# Patient Record
Sex: MALE | Race: WHITE | Employment: OTHER | URBAN - METROPOLITAN AREA
[De-identification: names, ages, dates, MRNs, and addresses within clinical notes are randomized per-mention and may not be internally consistent; named-entity substitution may affect disease eponyms.]

---

## 2017-12-20 ENCOUNTER — HOSPITAL ENCOUNTER (OUTPATIENT)
Dept: PREADMISSION TESTING | Age: 67
Discharge: HOME | End: 2017-12-20
Payer: MEDICARE

## 2017-12-20 VITALS — BODY MASS INDEX: 48.97 KG/M2 | HEIGHT: 67 IN | WEIGHT: 312 LBS

## 2017-12-20 LAB
ANION GAP SERPL CALC-SCNC: 10 MMOL/L (ref 3–18)
BACTERIA SPEC CULT: NORMAL
BASOPHILS # BLD: 0 K/UL (ref 0–0.06)
BASOPHILS NFR BLD: 0 % (ref 0–2)
BUN SERPL-MCNC: 28 MG/DL (ref 7–18)
BUN/CREAT SERPL: 19 (ref 12–20)
CALCIUM SERPL-MCNC: 9.8 MG/DL (ref 8.5–10.1)
CHLORIDE SERPL-SCNC: 107 MMOL/L (ref 100–108)
CO2 SERPL-SCNC: 26 MMOL/L (ref 21–32)
CREAT SERPL-MCNC: 1.51 MG/DL (ref 0.6–1.3)
DIFFERENTIAL METHOD BLD: ABNORMAL
EOSINOPHIL # BLD: 0.1 K/UL (ref 0–0.4)
EOSINOPHIL NFR BLD: 2 % (ref 0–5)
ERYTHROCYTE [DISTWIDTH] IN BLOOD BY AUTOMATED COUNT: 13.4 % (ref 11.6–14.5)
GLUCOSE SERPL-MCNC: 147 MG/DL (ref 74–99)
HBA1C MFR BLD: 6.5 % (ref 4.5–5.6)
HCT VFR BLD AUTO: 31.9 % (ref 36–48)
HGB BLD-MCNC: 10.4 G/DL (ref 13–16)
LYMPHOCYTES # BLD: 1.1 K/UL (ref 0.9–3.6)
LYMPHOCYTES NFR BLD: 20 % (ref 21–52)
MCH RBC QN AUTO: 30.8 PG (ref 24–34)
MCHC RBC AUTO-ENTMCNC: 32.6 G/DL (ref 31–37)
MCV RBC AUTO: 94.4 FL (ref 74–97)
MONOCYTES # BLD: 0.6 K/UL (ref 0.05–1.2)
MONOCYTES NFR BLD: 10 % (ref 3–10)
NEUTS SEG # BLD: 3.7 K/UL (ref 1.8–8)
NEUTS SEG NFR BLD: 68 % (ref 40–73)
PLATELET # BLD AUTO: 180 K/UL (ref 135–420)
PMV BLD AUTO: 9.4 FL (ref 9.2–11.8)
POTASSIUM SERPL-SCNC: 4.6 MMOL/L (ref 3.5–5.5)
RBC # BLD AUTO: 3.38 M/UL (ref 4.7–5.5)
SERVICE CMNT-IMP: NORMAL
SODIUM SERPL-SCNC: 143 MMOL/L (ref 136–145)
WBC # BLD AUTO: 5.6 K/UL (ref 4.6–13.2)

## 2017-12-20 PROCEDURE — 80048 BASIC METABOLIC PNL TOTAL CA: CPT | Performed by: ORTHOPAEDIC SURGERY

## 2017-12-20 PROCEDURE — 87086 URINE CULTURE/COLONY COUNT: CPT | Performed by: ORTHOPAEDIC SURGERY

## 2017-12-20 PROCEDURE — 83036 HEMOGLOBIN GLYCOSYLATED A1C: CPT | Performed by: ORTHOPAEDIC SURGERY

## 2017-12-20 PROCEDURE — 87641 MR-STAPH DNA AMP PROBE: CPT | Performed by: ORTHOPAEDIC SURGERY

## 2017-12-20 PROCEDURE — 85025 COMPLETE CBC W/AUTO DIFF WBC: CPT | Performed by: ORTHOPAEDIC SURGERY

## 2017-12-20 RX ORDER — MAGNESIUM 200 MG
800 TABLET ORAL 2 TIMES DAILY
COMMUNITY

## 2017-12-20 RX ORDER — TRAMADOL HYDROCHLORIDE 50 MG/1
50 TABLET ORAL
COMMUNITY
End: 2018-02-10

## 2017-12-20 RX ORDER — LOSARTAN POTASSIUM 25 MG/1
12.5 TABLET ORAL
COMMUNITY
End: 2018-01-18

## 2017-12-20 RX ORDER — METOPROLOL TARTRATE 50 MG/1
50 TABLET ORAL
COMMUNITY
End: 2018-01-18

## 2017-12-20 RX ORDER — INSULIN LISPRO 100 [IU]/ML
INJECTION, SOLUTION SUBCUTANEOUS
COMMUNITY

## 2017-12-20 RX ORDER — SODIUM CHLORIDE, SODIUM LACTATE, POTASSIUM CHLORIDE, CALCIUM CHLORIDE 600; 310; 30; 20 MG/100ML; MG/100ML; MG/100ML; MG/100ML
125 INJECTION, SOLUTION INTRAVENOUS CONTINUOUS
Status: CANCELLED | OUTPATIENT
Start: 2017-12-20

## 2017-12-20 RX ORDER — CHOLECALCIFEROL (VITAMIN D3) 125 MCG
1 CAPSULE ORAL DAILY
COMMUNITY
End: 2019-02-25

## 2017-12-20 NOTE — PERIOP NOTES
Pt reports sleep apnea and uses a CPAP. Instructed to bring in DOS. Pt has hearing aids but no other removable prosthetic devices. Care Fusion kit given to patient with instructions. Reviewed Jaren wipes. No family history of MH. Pt to check with endocrinologist re:  Evening Toujeo dosage. Pt had EKG at [facility]. Requested tracing. Pt meets criteria for special populations.

## 2017-12-21 LAB
BACTERIA SPEC CULT: NORMAL
SERVICE CMNT-IMP: NORMAL

## 2018-01-23 ENCOUNTER — HOSPITAL ENCOUNTER (OUTPATIENT)
Dept: PREADMISSION TESTING | Age: 68
Discharge: HOME | End: 2018-01-23
Payer: MEDICARE

## 2018-01-23 LAB
ALBUMIN SERPL-MCNC: 3.4 G/DL (ref 3.4–5)
ALBUMIN/GLOB SERPL: 1.1 {RATIO} (ref 0.8–1.7)
ALP SERPL-CCNC: 75 U/L (ref 45–117)
ALT SERPL-CCNC: 35 U/L (ref 16–61)
ANION GAP SERPL CALC-SCNC: 9 MMOL/L (ref 3–18)
AST SERPL-CCNC: 38 U/L (ref 15–37)
BILIRUB SERPL-MCNC: 0.7 MG/DL (ref 0.2–1)
BUN SERPL-MCNC: 33 MG/DL (ref 7–18)
BUN/CREAT SERPL: 24 (ref 12–20)
CALCIUM SERPL-MCNC: 10.4 MG/DL (ref 8.5–10.1)
CHLORIDE SERPL-SCNC: 109 MMOL/L (ref 100–108)
CO2 SERPL-SCNC: 26 MMOL/L (ref 21–32)
CREAT SERPL-MCNC: 1.38 MG/DL (ref 0.6–1.3)
ERYTHROCYTE [DISTWIDTH] IN BLOOD BY AUTOMATED COUNT: 14.4 % (ref 11.6–14.5)
GLOBULIN SER CALC-MCNC: 3 G/DL (ref 2–4)
GLUCOSE SERPL-MCNC: 95 MG/DL (ref 74–99)
HCT VFR BLD AUTO: 34.6 % (ref 36–48)
HGB BLD-MCNC: 10.9 G/DL (ref 13–16)
MCH RBC QN AUTO: 30.5 PG (ref 24–34)
MCHC RBC AUTO-ENTMCNC: 31.5 G/DL (ref 31–37)
MCV RBC AUTO: 96.9 FL (ref 74–97)
PLATELET # BLD AUTO: 135 K/UL (ref 135–420)
PMV BLD AUTO: 10.2 FL (ref 9.2–11.8)
POTASSIUM SERPL-SCNC: 5.5 MMOL/L (ref 3.5–5.5)
PROT SERPL-MCNC: 6.4 G/DL (ref 6.4–8.2)
RBC # BLD AUTO: 3.57 M/UL (ref 4.7–5.5)
SODIUM SERPL-SCNC: 144 MMOL/L (ref 136–145)
WBC # BLD AUTO: 4.4 K/UL (ref 4.6–13.2)

## 2018-01-23 PROCEDURE — 80053 COMPREHEN METABOLIC PANEL: CPT | Performed by: ORTHOPAEDIC SURGERY

## 2018-01-23 PROCEDURE — 85027 COMPLETE CBC AUTOMATED: CPT | Performed by: ORTHOPAEDIC SURGERY

## 2018-01-23 PROCEDURE — 36415 COLL VENOUS BLD VENIPUNCTURE: CPT | Performed by: ORTHOPAEDIC SURGERY

## 2018-01-30 ENCOUNTER — ANESTHESIA EVENT (OUTPATIENT)
Dept: SURGERY | Age: 68
DRG: 483 | End: 2018-01-30
Payer: MEDICARE

## 2018-02-03 NOTE — H&P
Patient Name:   Megan Linton  Account #:  [de-identified]  YOB: 1950    Chief Complaint:  Right shoulder pain and weakness. History of Chief Complaint: This is a 71-year-old male who complains of pain and weakness in his right shoulder. He has been seeing Dr. Marina Moreno, who had him get a MRI of the right shoulder. This study is reviewed. It shows cuff tear arthropathy with tears of the subscapularis, infraspinatus, and supraspinatus with a previous tear of the biceps.     Past Medical/Surgical History:    Disease/Disorder Type Date Side Surgery Date Side Comment       Cataract extraction  bilateral    Arthritis          Chronic renal disease stage 3          Coronary artery disease          Diabetes type 2          Diabetic neuropathy          Heart disease          High cholesterol          Hypertension          Obesity          Sleep apnea    CPAP          Aortic value replacement, bioprosthetic                    Spinal fusion, lumbar 10/07/2015 L3-S1        CABG 2012         Carpal tunnel release 2013       Allergies:    Ingredient Reaction Medication Name Comment   CODEINE      ACETAMINOPHEN  Percocet    OXYCODONE HCL  Percocet        Current Medications:    Medication Directions   furosemide 80 mg tablet take 1 tablet by oral route  every other day   Aspirin Low Dose 81 mg tablet,delayed release take 1 tablet by oral route  every morning   atorvastatin 80 mg tablet take 1 tablet by oral route  every morning   fenofibrate nanocrystallized 48 mg tablet take 1 tablet by oral route  every morning   Humalog Mix 75-25 100 unit/mL subcutaneous suspension per sliding scale   tramadol 50 mg tablet take 1 tablet by oral route  as needed   Colace 100 mg capsule take 1 capsule by oral route  every morning   Claritin 10 mg tablet take 1 tablet by oral route as needed   multivitamin tablet take 1 tablet by oral route  every morning   metoprolol succinate ER 50 mg tablet,extended release 24 hr take 1 tablet by oral route  every evening   metoprolol succinate  mg tablet,extended release 24 hr take 1 tablet by oral route  every morning   nitroglycerin 0.4 mg sublingual tablet place 1 tablet by sublingual route at 1st sign of attack; may repeat every 5 minutes up to 3 tabs; if no relief seek medical help   Vitamin D2 50,000 unit capsule take 1 capsule by oral route  every week   Toujeo SoloStar 300 unit/mL (1.5 mL) subcutaneous insulin pen inject by subcutaneous route as per insulin protocol   amlodipine 10 mg tablet take 1 tablet by oral route  every day   Veltassa 8.4 gram oral powder packet take 2 packet by oral route  every day add to 30 mL water and mix; add additional 60 mL water, stir and drink immediately     Social History:    SMOKING  Status Tobacco Type Units Per Day Yrs Used   Never smoker      ALCOHOL  There is no history of alcohol use. Family History:    Disease Detail Family Member Age Cause of Death Comments   Family history of Cancer, unknown   N    Family history of Diabetes mellitus   N    Family history of Heart disease   N    Family history of Alcoholism   N    Family history of Hypertension   N    Family history of Renal disease   N      Review of Systems:    Pertinent negatives include fever, fainting and swelling of feet. Vitals:  Date BP Pulse Temp (F) Resp. (per min.) Height (Total in.) Weight (lbs.) BMI   10/20/2017     70.00  42.76   07/06/2015 170/86 63   70.00  42.76     Physical Examination:  General:  Patient in no acute distress. Vital Signs: See database for vital signs. HEENT: Normal.  Neck: Supple. Chest: Clear. Heart: Regular sinus rhythm. Abdomen: Soft, nontender, no adenopathy. Neurologic: Normal exam.  Extremities:    Physical exam of the right shoulder shows generally good motion, but he has weakness both in external rotation and abduction. He has pain with extremes of motion.   Neurological exam is normal.      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 Plan:    He will be scheduled for a right reverse total shoulder arthroplasty. He understands the risks and benefits of the procedure, and he is ready to proceed. He was given a sling. Postop, he will get Dilaudid 4 mg.

## 2018-02-09 ENCOUNTER — HOSPITAL ENCOUNTER (INPATIENT)
Age: 68
LOS: 1 days | Discharge: HOME | DRG: 483 | End: 2018-02-10
Attending: ORTHOPAEDIC SURGERY | Admitting: ORTHOPAEDIC SURGERY
Payer: MEDICARE

## 2018-02-09 ENCOUNTER — ANESTHESIA (OUTPATIENT)
Dept: SURGERY | Age: 68
DRG: 483 | End: 2018-02-09
Payer: MEDICARE

## 2018-02-09 ENCOUNTER — APPOINTMENT (OUTPATIENT)
Dept: GENERAL RADIOLOGY | Age: 68
DRG: 483 | End: 2018-02-09
Attending: ORTHOPAEDIC SURGERY
Payer: MEDICARE

## 2018-02-09 DIAGNOSIS — M19.011 PRIMARY OSTEOARTHRITIS OF RIGHT SHOULDER: Primary | ICD-10-CM

## 2018-02-09 LAB
ABO + RH BLD: NORMAL
BLOOD GROUP ANTIBODIES SERPL: NORMAL
GLUCOSE BLD STRIP.AUTO-MCNC: 169 MG/DL (ref 70–110)
GLUCOSE BLD STRIP.AUTO-MCNC: 74 MG/DL (ref 70–110)
GLUCOSE BLD STRIP.AUTO-MCNC: 83 MG/DL (ref 70–110)
GLUCOSE BLD STRIP.AUTO-MCNC: 85 MG/DL (ref 70–110)
GLUCOSE BLD STRIP.AUTO-MCNC: 85 MG/DL (ref 70–110)
SPECIMEN EXP DATE BLD: NORMAL

## 2018-02-09 PROCEDURE — 77030002922 HC SUT FBRWRE ARTH -B: Performed by: ORTHOPAEDIC SURGERY

## 2018-02-09 PROCEDURE — 36415 COLL VENOUS BLD VENIPUNCTURE: CPT | Performed by: ANESTHESIOLOGY

## 2018-02-09 PROCEDURE — 74011250636 HC RX REV CODE- 250/636: Performed by: ANESTHESIOLOGY

## 2018-02-09 PROCEDURE — 77030008683 HC TU ET CUF COVD -A: Performed by: ANESTHESIOLOGY

## 2018-02-09 PROCEDURE — 74011250636 HC RX REV CODE- 250/636

## 2018-02-09 PROCEDURE — C1776 JOINT DEVICE (IMPLANTABLE): HCPCS | Performed by: ORTHOPAEDIC SURGERY

## 2018-02-09 PROCEDURE — 77030031139 HC SUT VCRL2 J&J -A: Performed by: ORTHOPAEDIC SURGERY

## 2018-02-09 PROCEDURE — 74011250636 HC RX REV CODE- 250/636: Performed by: ORTHOPAEDIC SURGERY

## 2018-02-09 PROCEDURE — 77030006807 HC BLD SAW RECIP KMET -B: Performed by: ORTHOPAEDIC SURGERY

## 2018-02-09 PROCEDURE — 77030037875 HC DRSG MEPILEX <16IN BORD MOLN -A: Performed by: ORTHOPAEDIC SURGERY

## 2018-02-09 PROCEDURE — 76942 ECHO GUIDE FOR BIOPSY: CPT | Performed by: ORTHOPAEDIC SURGERY

## 2018-02-09 PROCEDURE — 77030012891

## 2018-02-09 PROCEDURE — 77030013728 HC IRR FEM CNL STRY -B: Performed by: ORTHOPAEDIC SURGERY

## 2018-02-09 PROCEDURE — 74011250637 HC RX REV CODE- 250/637: Performed by: ORTHOPAEDIC SURGERY

## 2018-02-09 PROCEDURE — 97161 PT EVAL LOW COMPLEX 20 MIN: CPT

## 2018-02-09 PROCEDURE — 77030020255 HC SOL INJ LR 1000ML BG: Performed by: ORTHOPAEDIC SURGERY

## 2018-02-09 PROCEDURE — 77030008477 HC STYL SATN SLP COVD -A: Performed by: ANESTHESIOLOGY

## 2018-02-09 PROCEDURE — 76060000035 HC ANESTHESIA 2 TO 2.5 HR: Performed by: ORTHOPAEDIC SURGERY

## 2018-02-09 PROCEDURE — 74011000250 HC RX REV CODE- 250: Performed by: ORTHOPAEDIC SURGERY

## 2018-02-09 PROCEDURE — 97110 THERAPEUTIC EXERCISES: CPT

## 2018-02-09 PROCEDURE — 76010000131 HC OR TIME 2 TO 2.5 HR: Performed by: ORTHOPAEDIC SURGERY

## 2018-02-09 PROCEDURE — 65270000029 HC RM PRIVATE

## 2018-02-09 PROCEDURE — 77030013079 HC BLNKT BAIR HGGR 3M -A: Performed by: ANESTHESIOLOGY

## 2018-02-09 PROCEDURE — 76210000016 HC OR PH I REC 1 TO 1.5 HR: Performed by: ORTHOPAEDIC SURGERY

## 2018-02-09 PROCEDURE — 0RRJ00Z REPLACEMENT OF RIGHT SHOULDER JOINT WITH REVERSE BALL AND SOCKET SYNTHETIC SUBSTITUTE, OPEN APPROACH: ICD-10-PCS | Performed by: ORTHOPAEDIC SURGERY

## 2018-02-09 PROCEDURE — 74011636637 HC RX REV CODE- 636/637: Performed by: ORTHOPAEDIC SURGERY

## 2018-02-09 PROCEDURE — 82962 GLUCOSE BLOOD TEST: CPT

## 2018-02-09 PROCEDURE — 77030011640 HC PAD GRND REM COVD -A: Performed by: ORTHOPAEDIC SURGERY

## 2018-02-09 PROCEDURE — 77030018846 HC SOL IRR STRL H20 ICUM -A

## 2018-02-09 PROCEDURE — 77030006643: Performed by: ANESTHESIOLOGY

## 2018-02-09 PROCEDURE — 86900 BLOOD TYPING SEROLOGIC ABO: CPT | Performed by: ANESTHESIOLOGY

## 2018-02-09 PROCEDURE — 77030013708 HC HNDPC SUC IRR PULS STRY –B: Performed by: ORTHOPAEDIC SURGERY

## 2018-02-09 PROCEDURE — 77030020782 HC GWN BAIR PAWS FLX 3M -B: Performed by: ORTHOPAEDIC SURGERY

## 2018-02-09 PROCEDURE — 73020 X-RAY EXAM OF SHOULDER: CPT

## 2018-02-09 PROCEDURE — 97530 THERAPEUTIC ACTIVITIES: CPT

## 2018-02-09 PROCEDURE — 74011000250 HC RX REV CODE- 250

## 2018-02-09 PROCEDURE — 74011000250 HC RX REV CODE- 250: Performed by: ANESTHESIOLOGY

## 2018-02-09 PROCEDURE — 77030033138 HC SUT PGA STRATFX J&J -B: Performed by: ORTHOPAEDIC SURGERY

## 2018-02-09 PROCEDURE — 77030020256 HC SOL INJ NACL 0.9%  500ML: Performed by: ORTHOPAEDIC SURGERY

## 2018-02-09 PROCEDURE — 77030027138 HC INCENT SPIROMETER -A: Performed by: ORTHOPAEDIC SURGERY

## 2018-02-09 PROCEDURE — 64415 NJX AA&/STRD BRCH PLXS IMG: CPT | Performed by: ANESTHESIOLOGY

## 2018-02-09 PROCEDURE — 77030018846 HC SOL IRR STRL H20 ICUM -A: Performed by: ORTHOPAEDIC SURGERY

## 2018-02-09 DEVICE — KIT BNE SCR L26MM DIA4.5MM GLEN SHLDR ORNG COMPR LOK CAP: Type: IMPLANTABLE DEVICE | Site: SHOULDER | Status: FUNCTIONAL

## 2018-02-09 DEVICE — IMPLANTABLE DEVICE: Type: IMPLANTABLE DEVICE | Site: SHOULDER | Status: FUNCTIONAL

## 2018-02-09 DEVICE — SPHERE GLEN DIA42MM REG STD SHLDR CO CHROM LCK SCR PRI RVS: Type: IMPLANTABLE DEVICE | Site: SHOULDER | Status: FUNCTIONAL

## 2018-02-09 DEVICE — LINER HUM DIA42MM +0MM OFFSET STD SHLDR PRI RVS EQUINOXE: Type: IMPLANTABLE DEVICE | Site: SHOULDER | Status: FUNCTIONAL

## 2018-02-09 DEVICE — SCR TORQUE DEFINING KIT -- EQUINOXE: Type: IMPLANTABLE DEVICE | Site: SHOULDER | Status: FUNCTIONAL

## 2018-02-09 DEVICE — TRAY HUM ADPT REV +0 -- EQUINOXE: Type: IMPLANTABLE DEVICE | Site: SHOULDER | Status: FUNCTIONAL

## 2018-02-09 DEVICE — SCR LCK CAP KIT 4.5X30MM BLU -- EQUINOXE: Type: IMPLANTABLE DEVICE | Site: SHOULDER | Status: FUNCTIONAL

## 2018-02-09 DEVICE — KIT BONE SCR L38MM DIA4.5MM GLEN SHLDR GRN COMPR LCK CAP RVS: Type: IMPLANTABLE DEVICE | Site: SHOULDER | Status: FUNCTIONAL

## 2018-02-09 DEVICE — SCR BNE LCK GLENOSPHERE -- EQUINOXE: Type: IMPLANTABLE DEVICE | Site: SHOULDER | Status: FUNCTIONAL

## 2018-02-09 RX ORDER — NALOXONE HYDROCHLORIDE 0.4 MG/ML
0.1 INJECTION, SOLUTION INTRAMUSCULAR; INTRAVENOUS; SUBCUTANEOUS AS NEEDED
Status: DISCONTINUED | OUTPATIENT
Start: 2018-02-09 | End: 2018-02-09 | Stop reason: HOSPADM

## 2018-02-09 RX ORDER — INSULIN LISPRO 100 [IU]/ML
INJECTION, SOLUTION INTRAVENOUS; SUBCUTANEOUS ONCE
Status: DISCONTINUED | OUTPATIENT
Start: 2018-02-09 | End: 2018-02-09 | Stop reason: HOSPADM

## 2018-02-09 RX ORDER — METOPROLOL SUCCINATE 100 MG/1
100 TABLET, EXTENDED RELEASE ORAL DAILY
Status: DISCONTINUED | OUTPATIENT
Start: 2018-02-10 | End: 2018-02-10 | Stop reason: HOSPADM

## 2018-02-09 RX ORDER — PROPOFOL 10 MG/ML
INJECTION, EMULSION INTRAVENOUS AS NEEDED
Status: DISCONTINUED | OUTPATIENT
Start: 2018-02-09 | End: 2018-02-09 | Stop reason: HOSPADM

## 2018-02-09 RX ORDER — ONDANSETRON 2 MG/ML
INJECTION INTRAMUSCULAR; INTRAVENOUS AS NEEDED
Status: DISCONTINUED | OUTPATIENT
Start: 2018-02-09 | End: 2018-02-09 | Stop reason: HOSPADM

## 2018-02-09 RX ORDER — FENTANYL CITRATE 50 UG/ML
50 INJECTION, SOLUTION INTRAMUSCULAR; INTRAVENOUS
Status: DISCONTINUED | OUTPATIENT
Start: 2018-02-09 | End: 2018-02-09 | Stop reason: HOSPADM

## 2018-02-09 RX ORDER — ASPIRIN 325 MG
325 TABLET, DELAYED RELEASE (ENTERIC COATED) ORAL
Status: DISCONTINUED | OUTPATIENT
Start: 2018-02-10 | End: 2018-02-10

## 2018-02-09 RX ORDER — MIDAZOLAM HYDROCHLORIDE 1 MG/ML
INJECTION, SOLUTION INTRAMUSCULAR; INTRAVENOUS
Status: COMPLETED
Start: 2018-02-09 | End: 2018-02-09

## 2018-02-09 RX ORDER — HYDROMORPHONE HYDROCHLORIDE 4 MG/1
4 TABLET ORAL
Status: DISCONTINUED | OUTPATIENT
Start: 2018-02-09 | End: 2018-02-10 | Stop reason: HOSPADM

## 2018-02-09 RX ORDER — ROCURONIUM BROMIDE 10 MG/ML
INJECTION, SOLUTION INTRAVENOUS AS NEEDED
Status: DISCONTINUED | OUTPATIENT
Start: 2018-02-09 | End: 2018-02-09 | Stop reason: HOSPADM

## 2018-02-09 RX ORDER — SODIUM CHLORIDE, SODIUM LACTATE, POTASSIUM CHLORIDE, CALCIUM CHLORIDE 600; 310; 30; 20 MG/100ML; MG/100ML; MG/100ML; MG/100ML
1000 INJECTION, SOLUTION INTRAVENOUS CONTINUOUS
Status: DISCONTINUED | OUTPATIENT
Start: 2018-02-09 | End: 2018-02-09 | Stop reason: HOSPADM

## 2018-02-09 RX ORDER — EPHEDRINE SULFATE/0.9% NACL/PF 25 MG/5 ML
SYRINGE (ML) INTRAVENOUS AS NEEDED
Status: DISCONTINUED | OUTPATIENT
Start: 2018-02-09 | End: 2018-02-09 | Stop reason: HOSPADM

## 2018-02-09 RX ORDER — INSULIN GLARGINE 100 [IU]/ML
50 INJECTION, SOLUTION SUBCUTANEOUS
Status: DISCONTINUED | OUTPATIENT
Start: 2018-02-09 | End: 2018-02-10 | Stop reason: HOSPADM

## 2018-02-09 RX ORDER — MIDAZOLAM HYDROCHLORIDE 1 MG/ML
INJECTION, SOLUTION INTRAMUSCULAR; INTRAVENOUS AS NEEDED
Status: DISCONTINUED | OUTPATIENT
Start: 2018-02-09 | End: 2018-02-09 | Stop reason: HOSPADM

## 2018-02-09 RX ORDER — SODIUM CHLORIDE, SODIUM LACTATE, POTASSIUM CHLORIDE, CALCIUM CHLORIDE 600; 310; 30; 20 MG/100ML; MG/100ML; MG/100ML; MG/100ML
125 INJECTION, SOLUTION INTRAVENOUS CONTINUOUS
Status: DISCONTINUED | OUTPATIENT
Start: 2018-02-09 | End: 2018-02-10 | Stop reason: HOSPADM

## 2018-02-09 RX ORDER — OXYCODONE AND ACETAMINOPHEN 5; 325 MG/1; MG/1
2 TABLET ORAL
Status: DISCONTINUED | OUTPATIENT
Start: 2018-02-09 | End: 2018-02-09

## 2018-02-09 RX ORDER — SODIUM CHLORIDE 0.9 % (FLUSH) 0.9 %
5-10 SYRINGE (ML) INJECTION EVERY 8 HOURS
Status: DISCONTINUED | OUTPATIENT
Start: 2018-02-09 | End: 2018-02-10 | Stop reason: HOSPADM

## 2018-02-09 RX ORDER — MELATONIN
2000 DAILY
Status: DISCONTINUED | OUTPATIENT
Start: 2018-02-10 | End: 2018-02-10 | Stop reason: HOSPADM

## 2018-02-09 RX ORDER — SODIUM CHLORIDE 0.9 % (FLUSH) 0.9 %
5-10 SYRINGE (ML) INJECTION AS NEEDED
Status: DISCONTINUED | OUTPATIENT
Start: 2018-02-09 | End: 2018-02-10 | Stop reason: HOSPADM

## 2018-02-09 RX ORDER — LIDOCAINE HYDROCHLORIDE 20 MG/ML
INJECTION, SOLUTION EPIDURAL; INFILTRATION; INTRACAUDAL; PERINEURAL AS NEEDED
Status: DISCONTINUED | OUTPATIENT
Start: 2018-02-09 | End: 2018-02-09 | Stop reason: HOSPADM

## 2018-02-09 RX ORDER — INSULIN LISPRO 100 [IU]/ML
INJECTION, SOLUTION INTRAVENOUS; SUBCUTANEOUS
Status: DISCONTINUED | OUTPATIENT
Start: 2018-02-09 | End: 2018-02-10 | Stop reason: HOSPADM

## 2018-02-09 RX ORDER — LANOLIN ALCOHOL/MO/W.PET/CERES
800 CREAM (GRAM) TOPICAL 2 TIMES DAILY
Status: DISCONTINUED | OUTPATIENT
Start: 2018-02-09 | End: 2018-02-10 | Stop reason: HOSPADM

## 2018-02-09 RX ORDER — DIPHENHYDRAMINE HYDROCHLORIDE 50 MG/ML
12.5 INJECTION, SOLUTION INTRAMUSCULAR; INTRAVENOUS
Status: DISCONTINUED | OUTPATIENT
Start: 2018-02-09 | End: 2018-02-10 | Stop reason: HOSPADM

## 2018-02-09 RX ORDER — BUPIVACAINE HYDROCHLORIDE AND EPINEPHRINE 5; 5 MG/ML; UG/ML
INJECTION, SOLUTION EPIDURAL; INTRACAUDAL; PERINEURAL AS NEEDED
Status: DISCONTINUED | OUTPATIENT
Start: 2018-02-09 | End: 2018-02-09 | Stop reason: HOSPADM

## 2018-02-09 RX ORDER — MAGNESIUM SULFATE 100 %
4 CRYSTALS MISCELLANEOUS AS NEEDED
Status: DISCONTINUED | OUTPATIENT
Start: 2018-02-09 | End: 2018-02-09 | Stop reason: HOSPADM

## 2018-02-09 RX ORDER — NALOXONE HYDROCHLORIDE 0.4 MG/ML
0.1 INJECTION, SOLUTION INTRAMUSCULAR; INTRAVENOUS; SUBCUTANEOUS AS NEEDED
Status: DISCONTINUED | OUTPATIENT
Start: 2018-02-09 | End: 2018-02-10 | Stop reason: HOSPADM

## 2018-02-09 RX ORDER — FENOFIBRATE 48 MG/1
48 TABLET, COATED ORAL DAILY
Status: DISCONTINUED | OUTPATIENT
Start: 2018-02-10 | End: 2018-02-10 | Stop reason: HOSPADM

## 2018-02-09 RX ORDER — ATORVASTATIN CALCIUM 20 MG/1
80 TABLET, FILM COATED ORAL DAILY
Status: DISCONTINUED | OUTPATIENT
Start: 2018-02-10 | End: 2018-02-10 | Stop reason: HOSPADM

## 2018-02-09 RX ORDER — FENTANYL CITRATE 50 UG/ML
INJECTION, SOLUTION INTRAMUSCULAR; INTRAVENOUS
Status: COMPLETED
Start: 2018-02-09 | End: 2018-02-09

## 2018-02-09 RX ORDER — AMLODIPINE BESYLATE 5 MG/1
5 TABLET ORAL
Status: DISCONTINUED | OUTPATIENT
Start: 2018-02-09 | End: 2018-02-10 | Stop reason: HOSPADM

## 2018-02-09 RX ORDER — DIPHENHYDRAMINE HCL 25 MG
25 CAPSULE ORAL
Status: DISCONTINUED | OUTPATIENT
Start: 2018-02-09 | End: 2018-02-10 | Stop reason: HOSPADM

## 2018-02-09 RX ORDER — FLUMAZENIL 0.1 MG/ML
0.2 INJECTION INTRAVENOUS
Status: DISCONTINUED | OUTPATIENT
Start: 2018-02-09 | End: 2018-02-09 | Stop reason: HOSPADM

## 2018-02-09 RX ORDER — LORATADINE 10 MG/1
10 TABLET ORAL DAILY PRN
Status: DISCONTINUED | OUTPATIENT
Start: 2018-02-09 | End: 2018-02-10 | Stop reason: HOSPADM

## 2018-02-09 RX ORDER — ACETAMINOPHEN 325 MG/1
650 TABLET ORAL
Status: DISCONTINUED | OUTPATIENT
Start: 2018-02-09 | End: 2018-02-10 | Stop reason: HOSPADM

## 2018-02-09 RX ORDER — GLYCOPYRROLATE 0.2 MG/ML
INJECTION INTRAMUSCULAR; INTRAVENOUS AS NEEDED
Status: DISCONTINUED | OUTPATIENT
Start: 2018-02-09 | End: 2018-02-09 | Stop reason: HOSPADM

## 2018-02-09 RX ORDER — DOCUSATE SODIUM 100 MG/1
100 CAPSULE, LIQUID FILLED ORAL DAILY
Status: DISCONTINUED | OUTPATIENT
Start: 2018-02-10 | End: 2018-02-10 | Stop reason: HOSPADM

## 2018-02-09 RX ORDER — ONDANSETRON 2 MG/ML
4 INJECTION INTRAMUSCULAR; INTRAVENOUS
Status: DISCONTINUED | OUTPATIENT
Start: 2018-02-09 | End: 2018-02-10 | Stop reason: HOSPADM

## 2018-02-09 RX ORDER — OXYCODONE AND ACETAMINOPHEN 5; 325 MG/1; MG/1
1 TABLET ORAL
Status: DISCONTINUED | OUTPATIENT
Start: 2018-02-09 | End: 2018-02-09

## 2018-02-09 RX ORDER — SODIUM CHLORIDE 0.9 % (FLUSH) 0.9 %
5-10 SYRINGE (ML) INJECTION AS NEEDED
Status: DISCONTINUED | OUTPATIENT
Start: 2018-02-09 | End: 2018-02-09 | Stop reason: HOSPADM

## 2018-02-09 RX ORDER — METOPROLOL SUCCINATE 50 MG/1
50 TABLET, EXTENDED RELEASE ORAL EVERY OTHER DAY
Status: DISCONTINUED | OUTPATIENT
Start: 2018-02-09 | End: 2018-02-10 | Stop reason: HOSPADM

## 2018-02-09 RX ORDER — NITROGLYCERIN 0.4 MG/1
0.4 TABLET SUBLINGUAL
Status: DISCONTINUED | OUTPATIENT
Start: 2018-02-09 | End: 2018-02-10 | Stop reason: HOSPADM

## 2018-02-09 RX ORDER — DEXTROSE 50 % IN WATER (D50W) INTRAVENOUS SYRINGE
25-50 AS NEEDED
Status: DISCONTINUED | OUTPATIENT
Start: 2018-02-09 | End: 2018-02-09 | Stop reason: HOSPADM

## 2018-02-09 RX ORDER — FENTANYL CITRATE 50 UG/ML
INJECTION, SOLUTION INTRAMUSCULAR; INTRAVENOUS AS NEEDED
Status: DISCONTINUED | OUTPATIENT
Start: 2018-02-09 | End: 2018-02-09 | Stop reason: HOSPADM

## 2018-02-09 RX ORDER — ASPIRIN 81 MG/1
81 TABLET ORAL DAILY
Status: DISCONTINUED | OUTPATIENT
Start: 2018-02-10 | End: 2018-02-10 | Stop reason: HOSPADM

## 2018-02-09 RX ORDER — TRAMADOL HYDROCHLORIDE 50 MG/1
50 TABLET ORAL
Status: DISCONTINUED | OUTPATIENT
Start: 2018-02-09 | End: 2018-02-10 | Stop reason: HOSPADM

## 2018-02-09 RX ORDER — ROPIVACAINE HYDROCHLORIDE 5 MG/ML
INJECTION, SOLUTION EPIDURAL; INFILTRATION; PERINEURAL AS NEEDED
Status: DISCONTINUED | OUTPATIENT
Start: 2018-02-09 | End: 2018-02-09 | Stop reason: HOSPADM

## 2018-02-09 RX ADMIN — FUROSEMIDE 60 MG: 20 TABLET ORAL at 14:49

## 2018-02-09 RX ADMIN — ROCURONIUM BROMIDE 20 MG: 10 INJECTION, SOLUTION INTRAVENOUS at 08:30

## 2018-02-09 RX ADMIN — BUPIVACAINE HYDROCHLORIDE 6 ML/HR: 7.5 INJECTION, SOLUTION EPIDURAL; RETROBULBAR at 10:54

## 2018-02-09 RX ADMIN — Medication 3 G: at 14:51

## 2018-02-09 RX ADMIN — Medication 3 G: at 07:56

## 2018-02-09 RX ADMIN — Medication 10 MG: at 08:04

## 2018-02-09 RX ADMIN — ONDANSETRON 4 MG: 2 INJECTION INTRAMUSCULAR; INTRAVENOUS at 07:38

## 2018-02-09 RX ADMIN — Medication 10 MG: at 08:54

## 2018-02-09 RX ADMIN — ROCURONIUM BROMIDE 50 MG: 10 INJECTION, SOLUTION INTRAVENOUS at 07:48

## 2018-02-09 RX ADMIN — Medication 3 G: at 21:59

## 2018-02-09 RX ADMIN — Medication 10 MG: at 07:59

## 2018-02-09 RX ADMIN — GLYCOPYRROLATE 0.1 MG: 0.2 INJECTION INTRAMUSCULAR; INTRAVENOUS at 08:25

## 2018-02-09 RX ADMIN — ROPIVACAINE HYDROCHLORIDE 20 ML: 5 INJECTION, SOLUTION EPIDURAL; INFILTRATION; PERINEURAL at 07:20

## 2018-02-09 RX ADMIN — INSULIN GLARGINE 50 UNITS: 100 INJECTION, SOLUTION SUBCUTANEOUS at 22:00

## 2018-02-09 RX ADMIN — FENTANYL CITRATE 100 MCG: 50 INJECTION, SOLUTION INTRAMUSCULAR; INTRAVENOUS at 07:11

## 2018-02-09 RX ADMIN — MIDAZOLAM HYDROCHLORIDE 2 MG: 1 INJECTION, SOLUTION INTRAMUSCULAR; INTRAVENOUS at 07:11

## 2018-02-09 RX ADMIN — ROCURONIUM BROMIDE 10 MG: 10 INJECTION, SOLUTION INTRAVENOUS at 09:21

## 2018-02-09 RX ADMIN — FENTANYL CITRATE 25 MCG: 50 INJECTION, SOLUTION INTRAMUSCULAR; INTRAVENOUS at 09:54

## 2018-02-09 RX ADMIN — METOPROLOL SUCCINATE 50 MG: 50 TABLET, EXTENDED RELEASE ORAL at 22:01

## 2018-02-09 RX ADMIN — PROPOFOL 150 MG: 10 INJECTION, EMULSION INTRAVENOUS at 07:48

## 2018-02-09 RX ADMIN — ROPIVACAINE HYDROCHLORIDE 5 ML: 5 INJECTION, SOLUTION EPIDURAL; INFILTRATION; PERINEURAL at 07:23

## 2018-02-09 RX ADMIN — FENTANYL CITRATE 50 MCG: 50 INJECTION, SOLUTION INTRAMUSCULAR; INTRAVENOUS at 07:48

## 2018-02-09 RX ADMIN — FENTANYL CITRATE 25 MCG: 50 INJECTION, SOLUTION INTRAMUSCULAR; INTRAVENOUS at 09:56

## 2018-02-09 RX ADMIN — SODIUM CHLORIDE, SODIUM LACTATE, POTASSIUM CHLORIDE, AND CALCIUM CHLORIDE 125 ML/HR: 600; 310; 30; 20 INJECTION, SOLUTION INTRAVENOUS at 06:47

## 2018-02-09 RX ADMIN — AMLODIPINE BESYLATE 5 MG: 5 TABLET ORAL at 22:01

## 2018-02-09 RX ADMIN — MIDAZOLAM HYDROCHLORIDE 2 MG: 1 INJECTION, SOLUTION INTRAMUSCULAR; INTRAVENOUS at 07:38

## 2018-02-09 RX ADMIN — LIDOCAINE HYDROCHLORIDE 60 MG: 20 INJECTION, SOLUTION EPIDURAL; INFILTRATION; INTRACAUDAL; PERINEURAL at 07:48

## 2018-02-09 RX ADMIN — SODIUM CHLORIDE, SODIUM LACTATE, POTASSIUM CHLORIDE, AND CALCIUM CHLORIDE: 600; 310; 30; 20 INJECTION, SOLUTION INTRAVENOUS at 09:41

## 2018-02-09 RX ADMIN — Medication 10 MG: at 08:22

## 2018-02-09 RX ADMIN — Medication 800 MG: at 22:00

## 2018-02-09 RX ADMIN — SODIUM CHLORIDE, SODIUM LACTATE, POTASSIUM CHLORIDE, AND CALCIUM CHLORIDE: 600; 310; 30; 20 INJECTION, SOLUTION INTRAVENOUS at 07:46

## 2018-02-09 RX ADMIN — Medication 10 MG: at 07:56

## 2018-02-09 RX ADMIN — HYDROMORPHONE HYDROCHLORIDE: 10 INJECTION, SOLUTION INTRAMUSCULAR; INTRAVENOUS; SUBCUTANEOUS at 10:45

## 2018-02-09 NOTE — IP AVS SNAPSHOT
[address] [address] 
[phone] Patient: Igor Ku MRN: [id number] :1950 About your hospitalization You were admitted on:  2018 You last received care in the:  THE [facility] [address] You were discharged on:  February 10, 2018 Why you were hospitalized Your primary diagnosis was:  Not on File Your diagnoses also included:  Djd Of Right Shoulder Follow-up Information Follow up With Details Comments Contact Info Garry Smith MD On 2018 Follow up appointment @ 1:00pm [address] [address] 
[phone] Earl Ram MD   [address] [address] [place] [address] 
[phone] Discharge Orders None A check mahad indicates which time of day the medication should be taken. My Medications START taking these medications Instructions Each Dose to Equal  
 Morning Noon Evening Bedtime HYDROmorphone 4 mg tablet Commonly known as:  DILAUDID Your last dose was: Your next dose is: Take 1 Tab by mouth every four (4) hours as needed for Pain. Max Daily Amount: 24 mg.  
 4 mg CONTINUE taking these medications Instructions Each Dose to Equal  
 Morning Noon Evening Bedtime  
 amLODIPine 5 mg tablet Commonly known as:  Nhung Zapien Your last dose was: Your next dose is: Take 5 mg by mouth nightly. Indications: hypertension 5 mg  
    
   
   
   
  
 aspirin delayed-release 81 mg tablet Your last dose was: Your next dose is: Take  by mouth daily. atorvastatin 80 mg tablet Commonly known as:  LIPITOR Your last dose was: Your next dose is: Take 80 mg by mouth daily. Indications: hypercholesterolemia 80 mg CLARITIN 10 mg tablet Generic drug:  loratadine Your last dose was: Your next dose is: Take 10 mg by mouth as needed for Allergies. 10 mg  
    
   
   
   
  
 COLACE 100 mg capsule Generic drug:  docusate sodium Your last dose was: Your next dose is: Take 100 mg by mouth daily. 100 mg HumaLOG Noe KwikPen 100 unit/mL Inph Generic drug:  insulin lispro Your last dose was: Your next dose is:    
   
   
 by SubCUTAneous route Before breakfast, lunch, and dinner. Sliding scale- pt has very tight personal guidelines   Indications: type 2 diabetes mellitus LASIX 20 mg tablet Generic drug:  furosemide Your last dose was: Your next dose is: Take 60 mg by mouth every other day. Indications: Edema 60 mg  
    
   
   
   
  
 magnesium 200 mg Tab Your last dose was: Your next dose is: Take 800 mg by mouth two (2) times a day. 800 mg  
    
   
   
   
  
 * metoprolol succinate 100 mg tablet Commonly known as:  TOPROL-XL Your last dose was: Your next dose is: Take 100 mg by mouth daily. Indications: hypertension 100 mg  
    
   
   
   
  
 * metoprolol succinate 50 mg XL tablet Commonly known as:  TOPROL-XL Your last dose was: Your next dose is: Take 50 mg by mouth See Admin Instructions. Takes every other evening at bedtime. Indications: hypertension 50 mg  
    
   
   
   
  
 nitroglycerin 0.4 mg SL tablet Commonly known as:  NITROSTAT Your last dose was: Your next dose is:    
   
   
 by SubLINGual route every five (5) minutes as needed for Chest Pain. TOUJEO SOLOSTAR 300 unit/mL (1.5 mL) Inpn Generic drug:  insulin glargine Your last dose was: Your next dose is:    
   
   
 50 Units by SubCUTAneous route nightly. 50 Units TRICOR 48 mg tablet Generic drug:  fenofibrate nanocrystallized Your last dose was: Your next dose is: Take  by mouth daily. Indications: hypercholesterolemia VITAMIN D3 2,000 unit Tab Generic drug:  cholecalciferol (vitamin D3) Your last dose was: Your next dose is: Take 1 Tab by mouth daily. Indications: Vitamin D Deficiency 1 Tab * Notice: This list has 2 medication(s) that are the same as other medications prescribed for you. Read the directions carefully, and ask your doctor or other care provider to review them with you. STOP taking these medications   
 multivitamin tablet Commonly known as:  ONE A DAY  
   
  
 traMADol 50 mg tablet Commonly known as:  ULTRAM  
   
  
  
  
Where to Get Your Medications Information on where to get these meds will be given to you by the nurse or doctor. ! Ask your nurse or doctor about these medications HYDROmorphone 4 mg tablet Discharge Instructions Shoulder Replacement Surgery: What to Expect at Home Your Recovery Shoulder replacement surgery replaces the worn parts of your shoulder joint. When you leave the hospital, your arm will be in a sling. It will be helpful if there is someone to help you at home for the next few weeks or until you have more energy and can move around better. You will go home with a bandage and stitches or staples. You can remove the bandage when your doctor tells you to. If the stitches are not the type that dissolve, your doctor will remove them in 10 to 14 days. You may still have some mild pain, and the area may be swollen for several months after surgery. Your doctor will give you medicine for the pain.  
You will continue the rehabilitation program (rehab) you started in the hospital. The better you do with your rehab exercises, the sooner you will get your strength and movement back. Depending on your job, you may be able to return to work as early as 2 to 3 weeks after surgery, as long as you avoid certain arm movements, such as lifting. This care sheet gives you a general idea about how long it will take for you to recover. But each person recovers at a different pace. Follow the steps below to get better as quickly as possible. How can you care for yourself at home? Activity ? · Rest when you feel tired. You may take a nap, but don't stay in bed all day. ? · Work with your physical therapist to learn the best way to exercise. ? · You will have a sling to wear at night. And it's a good idea to also put a small stack of folded sheets or towels under your upper arm while you are in bed to keep your arm from dropping too far back. ? · Your arm should stay next to your body or in front of it for several weeks, both while you are up and during sleep. ? · Don't lift anything with the affected arm for 6 weeks. ? · You may need to take sponge baths until your stitches or staples have been removed. You will probably be able to shower 24 hours after they are removed. ? · Ask your doctor when you can drive again. ? · Ask your doctor when it is okay for you to have sex. ? · Your doctor may advise you to give up activities that put stress on that shoulder. This includes sports such as weight lifting or tennis, unless your tennis arm was not the one affected. Diet ? · By the time you leave the hospital, you will probably be eating your normal diet. If your stomach is upset, try bland, low-fat foods like plain rice, broiled chicken, toast, and yogurt. Your doctor may recommend that you take iron and vitamin supplements. ? · Drink plenty of fluids (unless your doctor tells you not to). ? · You may notice that your bowel movements are not regular right after your surgery. This is common.  Try to avoid constipation and straining with bowel movements. You may want to take a fiber supplement every day. If you have not had a bowel movement after a couple of days, ask your doctor about taking a mild laxative. Medicines ? · Your doctor will tell you if and when you can restart your medicines. He or she will also give you instructions about taking any new medicines. ? · If you take blood thinners, such as warfarin (Coumadin), clopidogrel (Plavix), or aspirin, be sure to talk to your doctor. He or she will tell you if and when to start taking those medicines again. Make sure that you understand exactly what your doctor wants you to do. ? · Be safe with medicines. Take pain medicines exactly as directed. ¨ If the doctor gave you a prescription medicine for pain, take it as prescribed. ¨ If you are not taking a prescription pain medicine, ask your doctor if you can take an over-the-counter medicine. ? · If you think your pain medicine is making you sick to your stomach: 
¨ Take your medicine after meals (unless your doctor has told you not to). ¨ Ask your doctor for a different pain medicine. ? · If your doctor prescribed antibiotics, take them as directed. Don't stop taking them just because you feel better. You need to take the full course of antibiotics. ? · If you take a blood thinner, be sure you get instructions about how to take your medicine safely. Blood thinners can cause serious bleeding problems. Incision care ? · You will have a dressing over the cut (incision). A dressing helps the incision heal and protects it. Your doctor will tell you how to take care of this. ? · Keep the area clean and dry. Exercise ? · Shoulder rehabilitation is a series of exercises you do after your surgery. This helps you get back your shoulder's range of motion and strength.  You will work with your doctor and physical therapist to plan this exercise program. To get the best results, you need to do the exercises correctly and as often and as long as your doctor tells you. ?Ice 
? · For pain, put ice or a cold pack on the area for 10 to 20 minutes at a time. Put a thin cloth between the ice and your skin. Follow-up care is a key part of your treatment and safety. Be sure to make and go to all appointments, and call your doctor if you are having problems. It's also a good idea to know your test results and keep a list of the medicines you take. When should you call for help? Call 911 anytime you think you may need emergency care. For example, call if: 
? · You have severe trouble breathing. ? · You have symptoms of a blood clot in your lung (called a pulmonary embolism). These may include: 
¨ Sudden chest pain. ¨ Trouble breathing. ¨ Coughing up blood. ?Call your doctor now or seek immediate medical care if: 
? · You have severe or increasing pain. ? · You have symptoms of infection, such as: 
¨ Increased pain, swelling, warmth, or redness. ¨ Red streaks or pus. ¨ A fever. ? · You have tingling, weakness, or numbness in your arm. ? · Your arm turns cold or changes color. ? · You have symptoms of a blood clot in your leg (called a deep vein thrombosis). These may include: 
¨ Pain in the calf, back of the knee, thigh, or groin. ¨ Redness and swelling in the leg or groin. ? Watch closely for changes in your health, and be sure to contact your doctor if: 
? · You do not get better as expected. Where can you learn more? Go to http://mery-nohemi.info/. Enter T633 in the search box to learn more about \"Shoulder Replacement Surgery: What to Expect at Home. \" 
Call Dr. Aurelio Everett office  if you have temperature greater than 100.1 unrelieved by an increase in fluids and tylenol, and increase in redness, swelling or drainage at incision site, and red streaks from the incision site, or you are unable to move arm or keep down food or medication. Patient armband removed and shredded Introducing [facility]! Jessi Santiago introduces [employer] patient portal. Now you can access parts of your medical record, email your doctor's office, and request medication refills online. 1. In your internet browser, go to [email or web address] 2. Click on the First Time User? Click Here link in the Sign In box. You will see the New Member Sign Up page. 3. Enter your [employer] Access Code exactly as it appears below. You will not need to use this code after youve completed the sign-up process. If you do not sign up before the expiration date, you must request a new code. · [employer] Access Code: [email or web address] Expires: 3/15/2018  6:09 PM 
 
4. Enter the last four digits of your Social Security Number (xxxx) and Date of Birth (mm/dd/yyyy) as indicated and click Submit. You will be taken to the next sign-up page. 5. Create a [employer] ID. This will be your [employer] login ID and cannot be changed, so think of one that is secure and easy to remember. 6. Create a [employer] password. You can change your password at any time. 7. Enter your Password Reset Question and Answer. This can be used at a later time if you forget your password. 8. Enter your e-mail address. You will receive e-mail notification when new information is available in [address] 9. Click Sign Up. You can now view and download portions of your medical record. 10. Click the Download Summary menu link to download a portable copy of your medical information. If you have questions, please visit the Frequently Asked Questions section of the [employer] website. Remember, [employer] is NOT to be used for urgent needs. For medical emergencies, dial 911. Now available from your iPhone and Android! Providers Seen During Your Hospitalization Provider Specialty Primary office phone Corby Maxwell, [address] [facility] [phone] Your Primary Care Physician (PCP) Primary Care Physician Office Phone Office Fax Remedios Santillan [phone] You are allergic to the following Allergen Reactions Codeine Rash Percocet (Oxycodone-Acetaminophen) Nausea Only No problem with acetaminophen Recent Documentation Height Weight BMI Smoking Status 1.753 m 138.3 kg 45.04 kg/m2 Never Smoker Emergency Contacts Name Discharge Info Relation Home Work Mobile [facility] DISCHARGE CAREGIVER [3] Son [22]   [phone] Patient Belongings The following personal items are in your possession at time of discharge: 
  Dental Appliances: None  Visual Aid: Glasses      Home Medications: None   Jewelry: None  Clothing: Undergarments, Footwear, Socks, Shirt, Pants (with Reed Noel)    Other Valuables: Cell Phone, Eyeglasses, Other (comment) (ID with Reed Noel and sunglasses) Please provide this summary of care documentation to your next provider. Signatures-by signing, you are acknowledging that this After Visit Summary has been reviewed with you and you have received a copy. Patient Signature:  ____________________________________________________________ Date:  ____________________________________________________________  
  
Spencer Pierre Provider Signature:  ____________________________________________________________ Date:  ____________________________________________________________

## 2018-02-09 NOTE — ANESTHESIA PREPROCEDURE EVALUATION
Anesthetic History   No history of anesthetic complications            Review of Systems / Medical History  Patient summary reviewed, nursing notes reviewed and pertinent labs reviewed    Pulmonary        Sleep apnea: CPAP           Neuro/Psych   Within defined limits           Cardiovascular    Hypertension: well controlled          Cardiac stents and CABG  Pertinent negatives: No valvular problems/murmurs  Exercise tolerance: >4 METS  Comments: Cardiac valve replacement   GI/Hepatic/Renal  Within defined limits              Endo/Other    Diabetes: well controlled, type 1    Morbid obesity and arthritis  Pertinent negatives: No hypothyroidism and hyperthyroidism   Other Findings              Physical Exam    Airway  Mallampati: III  TM Distance: < 4 cm  Neck ROM: normal range of motion   Mouth opening: Normal    Comments: beard Cardiovascular    Rhythm: regular  Rate: normal         Dental      Comments: Teeth with separations between incisors   Pulmonary  Breath sounds clear to auscultation               Abdominal  GI exam deferred       Other Findings            Anesthetic Plan    ASA: 3  Anesthesia type: general and regional - interscalene block      Post-op pain plan if not by surgeon: peripheral nerve block continuous    Induction: Intravenous  Anesthetic plan and risks discussed with: Patient      Risk of a block include nerve injury, bleeding, infection, and failure as the most common ones although they rare.

## 2018-02-09 NOTE — OP NOTES
PREOPERATIVE DIAGNOSIS: Cuff Tear Arthropathy, right shoulder. POSTOPERATIVE DIAGNOSIS: Cuff Tear Arthropathy, right shoulder. PROCEDURE: right Reverse Total Shoulder Arthroplasty [employer]    IMPLANTS:   Implant Name Type Inv. Item Serial No.  Lot No. LRB No. Used Action   SCR BNE LCK GLENOSPHERE -- E[employer] - [id number]  SCR BNE LCK GLENOSPHERE -- Mayra Quest [id number] [employer]  Right 1 Implanted   SCR TORQUE DEFINING KIT -- EQUIN[employer]E - [id number]  SCR TORQUE DEFINING KIT -- Mayra Quest [id number] [employer]  Right 1 Implanted   PLATE KRYSTA HUM ADPT REV +5MM -- Mayra Quest - [id number]  PLATE KRYSTA HUM ADPT REV +5MM -- Mayra Quest [id number] [employer]  Right 1 Implanted   HEAD GLENOSPHERE REV 42MM -- EQUINOXE - [id number]  HEAD GLENOSPHERE REV 42MM -- EQUIN[employer]E [id number] [employer]  Right 1 Implanted   SCR LCK CAP KIT 4.5X38MM RED -- EQUINOXE - [id number]  SCR LCK CAP KIT 4.5X38MM RED -- Mayra Quest [id number] [employer]  Right 1 Implanted   SCR LCK CAP KIT 4.5X30MM APOLLO -- EQUINOXE - [id number]  SCR LCK CAP KIT 4.5X30MM APOLLO -- EQUINOXE [id number] [employer]  Right 1 Implanted   SCR LCK CAP KIT 4.5X26MM ORG -- EQUINOXE - [id number]  SCR LCK CAP KIT 4.5X26MM ORG -- EQU[employer]E [id number] [employer]  Right 1 Implanted   TRAY HUM ADPT REV +0 -- EQUINOXE - [id number]  TRAY HUM ADPT REV +0 -- EQU[employer]E [id number] [employer]  Right 1 Implanted   EQUINOX HUMERAL STEM PRIMARY, PRESS-FIT   [id number] [employer]  Right 1 Implanted   LINER HUM REV 42MM +0 -- EQU[employer]OXE - [id number]   LINER HUM REV 42MM +0 -- EQUIN[employer]E [id number] [employer]   Right 1 Implanted       SURGEON: Elvis Cross MD    ANESTHESIOLOGIST: Anesthesiologist: Atul Barr MD  CRNA: Timmy Meckel, CRNA     ANAESTHESIA: general anesthesia after scalene block. COMPLICATIONS: None. ESTIMATED BLOOD LOSS: 200 cc     DRAINS:  None. DESCRIPTION OF PROCEDURE: This 75 y. o.year-old male  with a history of persistent right shoulder pain and weakness, had a history of an unrepairable rotator cuff tear and severe DJD of the glenohumeral joint. The patient then opted to proceed with reconstructive surgery. The patient was taken to the operating room and placed in the beach-chair position, and the right shoulder was prepped and draped in a normal manner. The skin was injected with 0.5% Marcaine with epinephrine. An oblique incision was made over the anterior aspect of the shoulder. The incision was carried through the subcutaneous tissue through the deep fascia and the cephalic vein was retracted laterally. The dissection was carried around the humeral head. The biceps was dissected free, tenodesed distally with #2 FiberWire suture and the intraarticular portion was excised. The humeral head was dislocated, and it was noted to have severe arthritic changes, along with a massive unrepairable tear of the rotator cuff. The head was osteotomized using a saw guide. The shaft was prepared using sequential reamers. The humeral head was retracted inferiorly and posteriorly. Attention was then turned to the glenoid, which showed severe arthritic changes. The labrum was excised circumferentially. The glenoid reamer was then used and finally the metaglene was placed in position, it was then secured using 3 screws, all of which were locking. The  glenosphere was then screwed in position. The proximal humerus was then prepared and trials were placed in position with sequential reductions. The trials were removed. The shaft was irrigated using antiobiotic solution via pulsed lavage. Trial reductions were again performed. The final humeral  component was placed in position. The shoulder was reduced. Good motion with no sign of instability was confirmed. The wound was thoroughly irrigated with antibiotic solution. Hemostasis was assured using a Bovie. The delto-pectoral interval was closed using 0 Vicryl suture. The subcutaneous tissue was closed with 2-0 vicryl and the skin with a Quill stitch.  A sterile compressive dressing was applied, along with a sling. The patient left the operating room in satisfactory condition.

## 2018-02-09 NOTE — IP AVS SNAPSHOT
[address] [address] 
[phone] Patient: Glenn Kim MRN: [id number] :1950 A check mahad indicates which time of day the medication should be taken. My Medications START taking these medications Instructions Each Dose to Equal  
 Morning Noon Evening Bedtime HYDROmorphone 4 mg tablet Commonly known as:  DILAUDID Your last dose was: Your next dose is: Take 1 Tab by mouth every four (4) hours as needed for Pain. Max Daily Amount: 24 mg.  
 4 mg CONTINUE taking these medications Instructions Each Dose to Equal  
 Morning Noon Evening Bedtime  
 amLODIPine 5 mg tablet Commonly known as:  Nicky Rich Your last dose was: Your next dose is: Take 5 mg by mouth nightly. Indications: hypertension 5 mg  
    
   
   
   
  
 aspirin delayed-release 81 mg tablet Your last dose was: Your next dose is: Take  by mouth daily. atorvastatin 80 mg tablet Commonly known as:  LIPITOR Your last dose was: Your next dose is: Take 80 mg by mouth daily. Indications: hypercholesterolemia 80 mg CLARITIN 10 mg tablet Generic drug:  loratadine Your last dose was: Your next dose is: Take 10 mg by mouth as needed for Allergies. 10 mg  
    
   
   
   
  
 COLACE 100 mg capsule Generic drug:  docusate sodium Your last dose was: Your next dose is: Take 100 mg by mouth daily. 100 mg HumaLOG Noe KwikPen 100 unit/mL Inph Generic drug:  insulin lispro Your last dose was: Your next dose is:    
   
   
 by SubCUTAneous route Before breakfast, lunch, and dinner.  Sliding scale- pt has very tight personal guidelines   Indications: type 2 diabetes mellitus LASIX 20 mg tablet Generic drug:  furosemide Your last dose was: Your next dose is: Take 60 mg by mouth every other day. Indications: Edema 60 mg  
    
   
   
   
  
 magnesium 200 mg Tab Your last dose was: Your next dose is: Take 800 mg by mouth two (2) times a day. 800 mg  
    
   
   
   
  
 * metoprolol succinate 100 mg tablet Commonly known as:  TOPROL-XL Your last dose was: Your next dose is: Take 100 mg by mouth daily. Indications: hypertension 100 mg  
    
   
   
   
  
 * metoprolol succinate 50 mg XL tablet Commonly known as:  TOPROL-XL Your last dose was: Your next dose is: Take 50 mg by mouth See Admin Instructions. Takes every other evening at bedtime. Indications: hypertension 50 mg  
    
   
   
   
  
 nitroglycerin 0.4 mg SL tablet Commonly known as:  NITROSTAT Your last dose was: Your next dose is:    
   
   
 by SubLINGual route every five (5) minutes as needed for Chest Pain. TOUJEO SOLOSTAR 300 unit/mL (1.5 mL) Inpn Generic drug:  insulin glargine Your last dose was: Your next dose is:    
   
   
 50 Units by SubCUTAneous route nightly. 50 Units TRICOR 48 mg tablet Generic drug:  fenofibrate nanocrystallized Your last dose was: Your next dose is: Take  by mouth daily. Indications: hypercholesterolemia VITAMIN D3 2,000 unit Tab Generic drug:  cholecalciferol (vitamin D3) Your last dose was: Your next dose is: Take 1 Tab by mouth daily. Indications: Vitamin D Deficiency 1 Tab * Notice: This list has 2 medication(s) that are the same as other medications prescribed for you.  Read the directions carefully, and ask your doctor or other care provider to review them with you. STOP taking these medications   
 multivitamin tablet Commonly known as:  ONE A DAY  
   
  
 traMADol 50 mg tablet Commonly known as:  ULTRAM  
   
  
  
  
Where to Get Your Medications Information on where to get these meds will be given to you by the nurse or doctor. ! Ask your nurse or doctor about these medications HYDROmorphone 4 mg tablet

## 2018-02-09 NOTE — PROGRESS NOTES
1205 - Patient arrives to unit at this time. Admission completed at this time. Patient is A/O x 4. IV to left arm intact and patent. SCDs and TEDs applied to bilateral legs. Mepilex dressing to right shoulder CDI. Sling in place to right arm. Slight numbness but no tingling. Radila pulses palpable. Pain 0/10. PCA pump in place and functioning. Patient was oriented to the room to include use of call bell, meal ordering, and use of incentive spirometer. Patient was given explanation of \" up for dinner\" program and has verbalized understanding. Phone and call bell left within reach. Plan of care for the day addressed with patient. Educated on pain medication availability and possible side effects. 1199-Paged Dr. Vida Alves d/t patient with nausea and vomiting after getting up with PT and concerning order for Percocet. 9803-Return call from Dr. Vida Alves regarding pain medication and nausea/vomiting. New order to discontinue Percocet and order Dilaudid 4 mg every 4 hours if not already ordered and new order for Zofran 4 mg IV q 4 hours for N/V. No Zofran given d/t patient without nausea/vomiting at this time. Shift summary-Patient ambulating to bathroom with one assist. Sling in place to right shoulder. PCA pump remains in place, but patient has not used. Voiding without difficulty. Tolerating meals.

## 2018-02-09 NOTE — ANESTHESIA PROCEDURE NOTES
Peripheral Block    Start time: 2/9/2018 7:11 AM  End time: 2/9/2018 7:23 AM  Performed by: Kacy Whitfield  Authorized by: Nicole Guerrero: Indications: at surgeon's request and post-op pain management    Preanesthetic Checklist: patient identified, risks and benefits discussed, site marked, timeout performed, anesthesia consent given and patient being monitored      Block Type:   Block Type: Interscalene  Laterality:  Right  Monitoring:  Standard ASA monitoring, continuous pulse ox, frequent vital sign checks, heart rate, responsive to questions and oxygen  Injection Technique:  Continuous  Procedures: ultrasound guided and nerve stimulator    Patient Position: seated  Prep: alcohol    Location:  Interscalene  Needle Gauge:  22 G  Needle Localization:  Anatomical landmarks, ultrasound guidance and nerve stimulator  Motor Response: minimal motor response >0.4 mA    Medication Injected:  0.5%  ropivacaine  Volume (mL):  25  Volume (mL):  0    Assessment:  Number of attempts:  1  Injection Assessment:  Incremental injection every 5 mL, local visualized surrounding nerve on ultrasound, negative aspiration for CSF, negative aspiration for blood, no paresthesia, no intravascular symptoms and ultrasound image on chart  Patient tolerance:  Patient tolerated the procedure well with no immediate complications  34G SB.

## 2018-02-09 NOTE — ADDENDUM NOTE
Addendum  created 02/09/18 1111 by Leonel Chun MD    Anesthesia Intra Blocks edited, Sign clinical note

## 2018-02-09 NOTE — ANESTHESIA POSTPROCEDURE EVALUATION
Post-Anesthesia Evaluation & Assessment    Visit Vitals    /57    Pulse 62    Temp 36.2 °C (97.2 °F)    Resp 17    Ht 5' 9\" (1.753 m)    Wt 138.3 kg (305 lb)    SpO2 99%    BMI 45.04 kg/m2       No untreated/active PONV    Post-operative hydration adequate. Adequate post-operative analgesia per PACU discharge criteria    Mental status & level of consciousness: alert and oriented x 3    Respiratory status: patent unassisted airway     No apparent anesthetic complications requiring additional post-anesthetic care    Patient has met all discharge requirements.             Bradley Owusu MD

## 2018-02-09 NOTE — PROGRESS NOTES
Problem: Mobility Impaired (Adult and Pediatric)  Goal: *Acute Goals and Plan of Care (Insert Text)  Physical Therapy Goals  Initiated 2/9/2018 and to be accomplished within 2-7 day(s)  1. Patient will move from supine to sit and sit to supine , scoot up and down and roll side to side in bed with supervision/set-up. 2.  Patient will transfer from bed to chair and chair to bed with supervision/set-up using the least restrictive device. 3.  Patient will perform sit to stand with supervision/set-up. 4.  Patient will ambulate with supervision/set-up for 150 feet with the least restrictive device. 5.  Patient will ascend/descend 4 stairs with one handrail(s) with minimal assistance/contact guard assist.  6. Patient will be independent with HEP exercises for  Reverse TSA. physical Therapy EVALUATION    Patient: Nahum Richards (89 y.o. male)  Date: 2/9/2018  Primary Diagnosis: RIGHT SHOULDER DEGENERATIVE JOINT DISEASE  DJD of right shoulder  Procedure(s) (LRB):  [employer] REVERSE TOTAL RIGHT SHOULDER ARTHROPLASTY GEN W/SCALENE BLOCK PRN \"SPEC POP\"  (Right) Day of Surgery   Precautions: falls      ASSESSMENT :  Based on the objective data described below, the patient presents with decrease bed mobility, transfer, strength, decreased R shoulder ROM and strength, impaired gait and balance. Patient supine in bed on entry, denied pain, R shoulder sling in place. Pt assisted to sitting on EOB with min A. Patient instructed in R UE ex per MD protocol, performing ex as documented below. Following exercises pt became nauseous and vomited, REYNOLD Urias aware. Patient stated he felt better following vomiting and requested to use bathroom. Sit to stand with CGA. Pt ambulates at home without AD inside, uses [employer] [place] outside. Pt attempted to ambulated with CGA with unsteadiness, and was given his cane. Pt ambulated to bathroom with cane and CGA/ min A due to unsteadiness.  Pt voided, assisted to don underwear and was assisted back to bed. Sit to supine with SBA. Pt left supine in bed, needs in reach, ice to R shoulder, pillow under arm for comfort, SCD's in place. Nurse Bridgett aware. Recommend HHPT at discharge, will continue to assess to determine if larger base QC required for safe ambulation. Patient will benefit from skilled intervention to address the above impairments. Patients rehabilitation potential is considered to be Good  Factors which may influence rehabilitation potential include:   []         None noted  []         Mental ability/status  [x]         Medical condition  []         Home/family situation and support systems  []         Safety awareness  []         Pain tolerance/management  []         Other:      PLAN :  Recommendations and Planned Interventions:  [x]           Bed Mobility Training             []    Neuromuscular Re-Education  [x]           Transfer Training                   []    Orthotic/Prosthetic Training  [x]           Gait Training                          []    Modalities  [x]           Therapeutic Exercises          []    Edema Management/Control  [x]           Therapeutic Activities            [x]    Patient and Family Training/Education  []           Other (comment):    Frequency/Duration: Patient will be followed by physical therapy twice daily to address goals. Discharge Recommendations: Home Health  Further Equipment Recommendations for Discharge: TBD     SUBJECTIVE:   Patient stated I have a sore throat.     OBJECTIVE DATA SUMMARY:     Past Medical History:   Diagnosis Date    Chronic kidney disease     stage 3     Chronic pain     lower back    Diabetes ([facility] [address] 2000    Hypertension 1990    Morbid obesity ([facility] [address] [facility]     OA (osteoarthritis)     Scoliosis     Unspecified sleep apnea     cpap 17.5 rate  instructed to bring CPAP day of surgery     Past Surgical History:   Procedure Laterality Date    CABG, ARTERY-VEIN, TWO  04/12/2012    HX AORTIC VALVE REPLACEMENT  04/12/2012    HX CATARACT REMOVAL Bilateral     HX HEENT      vocal cord growth benign    HX ORTHOPAEDIC      osteomyalitis right big toe; joint frozen    HX ORTHOPAEDIC      I.D. right elbow    HX ORTHOPAEDIC Bilateral     CTR    HX RETINAL DETACHMENT REPAIR      bilateral     Barriers to Learning/Limitations: None  Compensate with: N/A  Prior Level of Function/Home Situation: I ambulation without AD indoors, uses \"hurry cane\" outdoors. Patient is right handed  Home Situation  Home Environment: Private residence  # Steps to Enter: 3  Rails to Enter: No  One/Two Story Residence: One story  Living Alone: No  Support Systems: Family member(s)  Patient Expects to be Discharged to[de-identified] Private residence  Current DME Used/Available at Home: Denita Perez, straight, Cane, quad  Critical Behavior:  Neurologic State: Alert  Psychosocial  Purposeful Interaction: Yes  Pt Identified Daily Priority: Clinical issues (comment)  Caring Interventions: Reassure  Strength:    Strength: Generally decreased, functional  Tone & Sensation:   Tone: Normal      Range Of Motion:  AROM: Generally decreased, functional  Functional Mobility:  Bed Mobility:  Supine to Sit: Minimum assistance  Sit to Supine: Stand-by asssistance  Scooting: Stand-by asssistance  Transfers:  Sit to Stand: Contact guard assistance  Stand to Sit: Contact guard assistance  Balance:   Sitting: Intact  Standing: Impaired  Standing - Static: Good  Standing - Dynamic : Fair  Ambulation/Gait Training:  Distance (ft): 15 Feet (ft) (X2)  Assistive Device: Cane, quad;Gait belt  Ambulation - Level of Assistance: Contact guard assistance;Minimal assistance  Base of Support: Widened  Speed/Maureen: Slow  Therapeutic Exercises:   AROM R hand wrist and fingers X 10, required AA for supination/pronation, elbow fl/ext. Pt performed passive sh fl to Rshoulder assisting with LUE. Performed RUE pendulum exercises in standing with LUE support.   Pain:  Pain Scale 1: Numeric (0 - 10)  Pain Intensity 1: 0  Activity Tolerance:   Fair/good, limited by nausea  Please refer to the flowsheet for vital signs taken during this treatment. After treatment:   [] Patient left in no apparent distress sitting up in chair  [] Patient left sitting on EOB  [x] Patient left in no apparent distress in bed  [] Patient declined to be OOB at this time due to   [x] Call bell left within reach  [x] Nursing notified(Bridgett)  [] Caregiver present  [] Bed alarm activated  [] CPM placed on  knee with degrees of PROM    COMMUNICATION/EDUCATION:   [x]         Fall prevention education was provided and the patient/caregiver indicated understanding. [x]         Patient/family have participated as able in goal setting and plan of care. [x]         Patient/family agree to work toward stated goals and plan of care. []         Patient understands intent and goals of therapy, but is neutral about his/her participation. []         Patient is unable to participate in goal setting and plan of care. Thank you for this referral.    Mobility  Current  CJ= 20-39%   Goal  CI= 1-19%. The severity rating is based on the Level of Assistance required for Functional Mobility and ADLs.   Eval Complexity: History: HIGH Complexity :3+ comorbidities / personal factors will impact the outcome/ POC Exam:MEDIUM Complexity : 3 Standardized tests and measures addressing body structure, function, activity limitation and / or participation in recreation  Presentation: LOW Complexity : Stable, uncomplicated  Clinical Decision Making:Low Complexity Pt able to ambulate 13' with assist. Overall Complexity:LOW     Latesha Vargas PT   Time Calculation: 58 mins

## 2018-02-09 NOTE — PERIOP NOTES
TRANSFER - OUT REPORT:    Verbal report given to Viktor Urias(name) on Glenn Kim  being transferred to [facility](unit) for routine progression of care       Report consisted of patients Situation, Background, Assessment and   Recommendations(SBAR). Information from the following report(s) Procedure Summary, Intake/Output, MAR and Recent Results was reviewed with the receiving nurse. Lines:   Peripheral IV 55/44/40 Left Cephalic (Active)   Site Assessment Clean, dry, & intact 2/9/2018 11:10 AM   Phlebitis Assessment 0 2/9/2018 11:10 AM   Infiltration Assessment 0 2/9/2018 11:10 AM   Dressing Status Clean, dry, & intact 2/9/2018 11:10 AM   Dressing Type Tape 2/9/2018 11:10 AM   Hub Color/Line Status Infusing 2/9/2018 11:10 AM   Alcohol Cap Used No 2/9/2018  6:48 AM        Opportunity for questions and clarification was provided.       Patient transported with:   O2 @ 2 liters   Also Reviewed history as recorded in EMR, POC BS course today,

## 2018-02-09 NOTE — PROGRESS NOTES
Chart reviewed, pt admitted s/p reverse total right shoulder arthroplasty. Pt plans discharge home. If pt needs home health follow up or DME at discharge, please page on call CM  upon pt discharge. Care Management Interventions  PCP Verified by CM:  Yes  Transition of Care Consult (CM Consult): Discharge Planning  Physical Therapy Consult: Yes  Current Support Network: Lives with Spouse  Confirm Follow Up Transport: Family  Discharge Location  Discharge Placement: Home with family assistance

## 2018-02-09 NOTE — PERIOP NOTES
Patient accepted by [facility], Patient slef slide over from stretcher to bed, patient states he feels like his is congested. Initiated incentive spirometer and briefed patient on possible effects on diaphragmatic movement from intrascalene block. Bridgett advised and she will continue to assess and titrate perph. nerve catheter rate PRN, Sats and RR WNL.   Visit Vitals    /61    Pulse 61    Temp 97.6 °F (36.4 °C)    Resp 18    Ht 5' 9\" (1.753 m)    Wt 138.3 kg (305 lb)    SpO2 98%    BMI 45.04 kg/m2

## 2018-02-10 VITALS
HEART RATE: 70 BPM | HEIGHT: 69 IN | OXYGEN SATURATION: 100 % | BODY MASS INDEX: 45.18 KG/M2 | WEIGHT: 305 LBS | TEMPERATURE: 98.3 F | DIASTOLIC BLOOD PRESSURE: 67 MMHG | SYSTOLIC BLOOD PRESSURE: 145 MMHG | RESPIRATION RATE: 17 BRPM

## 2018-02-10 LAB — GLUCOSE BLD STRIP.AUTO-MCNC: 176 MG/DL (ref 70–110)

## 2018-02-10 PROCEDURE — 97110 THERAPEUTIC EXERCISES: CPT

## 2018-02-10 PROCEDURE — 97530 THERAPEUTIC ACTIVITIES: CPT

## 2018-02-10 PROCEDURE — 97116 GAIT TRAINING THERAPY: CPT

## 2018-02-10 PROCEDURE — 74011250637 HC RX REV CODE- 250/637: Performed by: ORTHOPAEDIC SURGERY

## 2018-02-10 PROCEDURE — 74011250636 HC RX REV CODE- 250/636: Performed by: ORTHOPAEDIC SURGERY

## 2018-02-10 PROCEDURE — 82962 GLUCOSE BLOOD TEST: CPT

## 2018-02-10 RX ORDER — HYDROMORPHONE HYDROCHLORIDE 4 MG/1
4 TABLET ORAL
Qty: 60 TAB | Refills: 0 | Status: SHIPPED | OUTPATIENT
Start: 2018-02-10 | End: 2019-02-25

## 2018-02-10 RX ADMIN — Medication 3 G: at 06:05

## 2018-02-10 RX ADMIN — ATORVASTATIN CALCIUM 80 MG: 20 TABLET, FILM COATED ORAL at 10:32

## 2018-02-10 RX ADMIN — HYDROMORPHONE HYDROCHLORIDE 4 MG: 4 TABLET ORAL at 12:45

## 2018-02-10 RX ADMIN — ASPIRIN 81 MG: 81 TABLET, COATED ORAL at 10:32

## 2018-02-10 RX ADMIN — MULTIPLE VITAMINS W/ MINERALS TAB 1 TABLET: TAB at 10:31

## 2018-02-10 RX ADMIN — VITAMIN D, TAB 1000IU (100/BT) 2000 UNITS: 25 TAB at 10:32

## 2018-02-10 RX ADMIN — DOCUSATE SODIUM 100 MG: 100 CAPSULE, LIQUID FILLED ORAL at 10:32

## 2018-02-10 RX ADMIN — Medication 800 MG: at 10:32

## 2018-02-10 RX ADMIN — FENOFIBRATE 48 MG: 48 TABLET ORAL at 10:32

## 2018-02-10 RX ADMIN — METOPROLOL SUCCINATE 100 MG: 100 TABLET, EXTENDED RELEASE ORAL at 10:31

## 2018-02-10 NOTE — PROGRESS NOTES
0700-Changed diet to diabetic diet. 0835-Paged glycemic control to address issue of patient's insulin coverage-no guidelines in place for sliding scale. 0845-Patient ambulated with CNA to bathroom. 0846-Returned patient to bed, sitting up at bedside. 7736- Patient sitting up at side of bed at this time. A/O x 4. IV to left wrist  intact and patent. TEDS and SCDS to bilateral lower legs. Mepilex dressing to right shoulder CDI. On-Q infusing to right shoulder set at 6, dressing CDI. Patient denies numbness/tingling. Pedal and radial pulses palpable. Lungs clear. Bowel sounds active to all quadrants. Patient able to get to 1750 on the incentive spirometer. Pain 1/10.   0930-Spoke with Shonna HUSAIN concerning patient's sliding scale (no guidelines for dosage in place.) Per LISHA Galindo, patient is going home and can dose himself when home. 1035-Per patient, LISHA Galindo told him Dr. Jorge Galvez will be by today before he discharges. Will folowup with Shonna HUSAIN concerning how to address patient's insulin coverage should he be here waiting for Dr. Jorge Galvez long enough to need insulin coverage. 1050-Spoke with Shonna HUSAIN who stated Dr. Ori Suresh would be to see patient around 11 so no insulin changes need to be made as patient can treat his BS at home. 1118-PT Darrion Baez reported patient did well with PT, and she left the sling off since he was more comfortable without it and he had demonstrated he knows how to put it back on.  1119-LISHA Zapien reported to this nurse that the patient is \"Good to go,\" and can be discharged. 1240-Discharge instructions reviewed with patient at this time. Patient provided with instructions for On-Q removal.  Side effects of medications reviewed. Opportunity for questions and clarification was provided. Patient has verbalized understanding. Patient was provided with care notes to include side effects of RX's. Arm bands removed and shredded.  AVS reviewed with MAGALIE.    WESLEY removed. Dressing CDI.

## 2018-02-10 NOTE — PROGRESS NOTES
Problem: Falls - Risk of  Goal: *Absence of Falls  Document Bakari Fall Risk and appropriate interventions in the flowsheet.    Outcome: Progressing Towards Goal  Fall Risk Interventions:  Mobility Interventions: Patient to call before getting OOB, PT Consult for mobility concerns, Utilize walker, cane, or other assitive device    Mentation Interventions: Adequate sleep, hydration, pain control    Medication Interventions: Patient to call before getting OOB, Teach patient to arise slowly    Elimination Interventions: Call light in reach, Elevated toilet seat, Patient to call for help with toileting needs, Toilet paper/wipes in reach, Toileting schedule/hourly rounds, Urinal in reach

## 2018-02-10 NOTE — ROUTINE PROCESS
Bedside and Verbal shift change report given to Elli Craig RN by Fidel Huizar. Report included the following information SBAR, Kardex, OR Summary, Intake/Output and MAR.

## 2018-02-10 NOTE — PROGRESS NOTES
Progress Note      Patient: Yair Billingsley               Sex: male          DOA: 2/9/2018     YOB: 1950      Age:  75 y.o.        LOS:  LOS: 1 day     Status Post: Procedure(s):  EXACTECH REVERSE TOTAL RIGHT SHOULDER ARTHROPLASTY GEN W/SCALENE BLOCK PRN \"SPEC POP\"   Surgery Date: 2/9/2018            Subjective:     Yair Billingsley is a 75 y.o. male without c/o marked pain or nausea. Patient denies any complaint of calf pain/ SOB or difficulty breathing. Objective:      Visit Vitals    /68 (BP 1 Location: Left arm, BP Patient Position: Sitting)    Pulse 66    Temp 98.3 °F (36.8 °C)    Resp 17    Ht 5' 9\" (1.753 m)    Wt 138.3 kg (305 lb)    SpO2 100%    BMI 45.04 kg/m2       Physical Exam:   Dressing:  clean, dry, intact  Wiggles fingers and hand  Foot sensation intact to light touch  No hand edema, good radial pulses. Intake and Output:  Current Shift:  02/10 0701 - 02/10 1900  In: 400 [P.O.:400]  Out: 300 [Urine:300]  Last three shifts:  02/08 1901 - 02/10 0700  In: 9253 [P.O.:600; I.V.:4469]  Out: 200   Voiding Status:  + void without need for Orosco catheter    Lab/Data Reviewed:  No results for input(s): HGB, HCT, NA, K, BUN, CREA, GLU, HGBEXT, HCTEXT in the last 72 hours. Medications Reviewed    Assessment/Plan     Active Problems:    DJD of right shoulder (2/9/2018)          · Change IV to Saline Lock. · Discharge Planning for home. · ROM and HEP as per Dr. Nataly Florian. Follow up with Dr. Nataly Florian in two weeks. · Discharge home today. The patient was seen and examined by Dr. Hugh Ramos today as well and he is in agreement with above.

## 2018-02-10 NOTE — DISCHARGE INSTRUCTIONS
Shoulder Replacement Surgery: What to Expect at Home  Your Recovery    Shoulder replacement surgery replaces the worn parts of your shoulder joint. When you leave the hospital, your arm will be in a sling. It will be helpful if there is someone to help you at home for the next few weeks or until you have more energy and can move around better. You will go home with a bandage and stitches or staples. You can remove the bandage when your doctor tells you to. If the stitches are not the type that dissolve, your doctor will remove them in 10 to 14 days. You may still have some mild pain, and the area may be swollen for several months after surgery. Your doctor will give you medicine for the pain. You will continue the rehabilitation program (rehab) you started in the hospital. The better you do with your rehab exercises, the sooner you will get your strength and movement back. Depending on your job, you may be able to return to work as early as 2 to 3 weeks after surgery, as long as you avoid certain arm movements, such as lifting. This care sheet gives you a general idea about how long it will take for you to recover. But each person recovers at a different pace. Follow the steps below to get better as quickly as possible. How can you care for yourself at home? Activity  ? · Rest when you feel tired. You may take a nap, but don't stay in bed all day. ? · Work with your physical therapist to learn the best way to exercise. ? · You will have a sling to wear at night. And it's a good idea to also put a small stack of folded sheets or towels under your upper arm while you are in bed to keep your arm from dropping too far back. ? · Your arm should stay next to your body or in front of it for several weeks, both while you are up and during sleep. ? · Don't lift anything with the affected arm for 6 weeks. ? · You may need to take sponge baths until your stitches or staples have been removed.  You will probably be able to shower 24 hours after they are removed. ? · Ask your doctor when you can drive again. ? · Ask your doctor when it is okay for you to have sex. ? · Your doctor may advise you to give up activities that put stress on that shoulder. This includes sports such as weight lifting or tennis, unless your tennis arm was not the one affected. Diet  ? · By the time you leave the hospital, you will probably be eating your normal diet. If your stomach is upset, try bland, low-fat foods like plain rice, broiled chicken, toast, and yogurt. Your doctor may recommend that you take iron and vitamin supplements. ? · Drink plenty of fluids (unless your doctor tells you not to). ? · You may notice that your bowel movements are not regular right after your surgery. This is common. Try to avoid constipation and straining with bowel movements. You may want to take a fiber supplement every day. If you have not had a bowel movement after a couple of days, ask your doctor about taking a mild laxative. Medicines  ? · Your doctor will tell you if and when you can restart your medicines. He or she will also give you instructions about taking any new medicines. ? · If you take blood thinners, such as warfarin (Coumadin), clopidogrel (Plavix), or aspirin, be sure to talk to your doctor. He or she will tell you if and when to start taking those medicines again. Make sure that you understand exactly what your doctor wants you to do. ? · Be safe with medicines. Take pain medicines exactly as directed. ¨ If the doctor gave you a prescription medicine for pain, take it as prescribed. ¨ If you are not taking a prescription pain medicine, ask your doctor if you can take an over-the-counter medicine. ? · If you think your pain medicine is making you sick to your stomach:  ¨ Take your medicine after meals (unless your doctor has told you not to). ¨ Ask your doctor for a different pain medicine.    ? · If your doctor prescribed antibiotics, take them as directed. Don't stop taking them just because you feel better. You need to take the full course of antibiotics. ? · If you take a blood thinner, be sure you get instructions about how to take your medicine safely. Blood thinners can cause serious bleeding problems. Incision care  ? · You will have a dressing over the cut (incision). A dressing helps the incision heal and protects it. Your doctor will tell you how to take care of this. ? · Keep the area clean and dry. Exercise  ? · Shoulder rehabilitation is a series of exercises you do after your surgery. This helps you get back your shoulder's range of motion and strength. You will work with your doctor and physical therapist to plan this exercise program. To get the best results, you need to do the exercises correctly and as often and as long as your doctor tells you. ?Ice  ? · For pain, put ice or a cold pack on the area for 10 to 20 minutes at a time. Put a thin cloth between the ice and your skin. Follow-up care is a key part of your treatment and safety. Be sure to make and go to all appointments, and call your doctor if you are having problems. It's also a good idea to know your test results and keep a list of the medicines you take. When should you call for help? Call 911 anytime you think you may need emergency care. For example, call if:  ? · You have severe trouble breathing. ? · You have symptoms of a blood clot in your lung (called a pulmonary embolism). These may include:  ¨ Sudden chest pain. ¨ Trouble breathing. ¨ Coughing up blood. ?Call your doctor now or seek immediate medical care if:  ? · You have severe or increasing pain. ? · You have symptoms of infection, such as:  ¨ Increased pain, swelling, warmth, or redness. ¨ Red streaks or pus. ¨ A fever. ? · You have tingling, weakness, or numbness in your arm. ? · Your arm turns cold or changes color.    ? · You have symptoms of a blood clot in your leg (called a deep vein thrombosis). These may include:  ¨ Pain in the calf, back of the knee, thigh, or groin. ¨ Redness and swelling in the leg or groin. ? Watch closely for changes in your health, and be sure to contact your doctor if:  ? · You do not get better as expected. Where can you learn more? Go to http://mery-nohemi.info/. Enter D020 in the search box to learn more about \"Shoulder Replacement Surgery: What to Expect at Home. \"  Call Dr. Siddiqui Cornea office  if you have temperature greater than 100.1 unrelieved by an increase in fluids and tylenol, and increase in redness, swelling or drainage at incision site, and red streaks from the incision site, or you are unable to move arm or keep down food or medication.       Patient armband removed and shredded

## 2018-02-10 NOTE — ROUTINE PROCESS
Bedside and Verbal shift change report given to DREAD Maldonado RN (oncoming nurse) by PACU (offgoing nurse). Report included the following information SBAR, Kardex, Intake/Output and MAR.

## 2018-02-10 NOTE — PROGRESS NOTES
Problem: Mobility Impaired (Adult and Pediatric)  Goal: *Acute Goals and Plan of Care (Insert Text)  Physical Therapy Goals  Initiated 2/9/2018 and to be accomplished within 2-7 day(s)  1. Patient will move from supine to sit and sit to supine , scoot up and down and roll side to side in bed with supervision/set-up. 2.  Patient will transfer from bed to chair and chair to bed with supervision/set-up using the least restrictive device. 3.  Patient will perform sit to stand with supervision/set-up. 4.  Patient will ambulate with supervision/set-up for 150 feet with the least restrictive device. 5.  Patient will ascend/descend 4 stairs with one handrail(s) with minimal assistance/contact guard assist.  6. Patient will be independent with HEP exercises for  Reverse TSA. Outcome: Progressing Towards Goal  physical Therapy TREATMENT    Patient: Oscar Bell (76 y.o. male)  Date: 2/10/2018  Diagnosis: RIGHT SHOULDER DEGENERATIVE JOINT DISEASE  DJD of right shoulder <principal problem not specified>  Procedure(s) (LRB):  EXACTECH REVERSE TOTAL RIGHT SHOULDER ARTHROPLASTY GEN W/SCALENE BLOCK PRN \"SPEC POP\"  (Right) 1 Day Post-Op  Precautions:     Chart, physical therapy assessment, plan of care and goals were reviewed. ASSESSMENT:  Pt found dressed and seated EOB on PT arrival in NAD and reports pain R shoulder 1/10. Pt educated in safe placement of OnQ catheter tubing and bag as same found underneath back of shirt and posterior to pt sight at this time. OnQ tubing/bag repositioned for improved placement. Pt demonstrated independence in don/doffing of sling. Pt seen by anesthesiologist, then completed transfer training with cane re:safety. Progressed with GT/cane/S/mod I 80ft; noted 1-2 episodes of path deviation toward end of distance when fatigued. Educated in and completed stair negotiation (limited to 2 steps due to fatigue currently) with L handrail and CGA. Pt returned to room.   Fatigued following GT. Pt son reports pt not this fatigued usually. Advised pt and pt son that car should be driven as closely to entry step to home as possible. Pt assisted into home and left seated in chair, then car moved from entryway for maximal safety on home re-entry due to pt fatigue currently. Upon return to room pt seen by Dr Rafita Nicole and Ramon Gore. Subsequently completed therapeutic ex as noted below. Declined bed mobility trials at this time. Advised that sleeping in recliner or propped up in bed may be beneficial to decrease nighttime pain R shoulder in the future. Pt progressing and should reach gt distance goal in short time once home. Progression toward goals:  [x]      Improving appropriately and progressing toward goals  []      Improving slowly and progressing toward goals  []      Not making progress toward goals and plan of care will be adjusted     PLAN:  Patient continues to benefit from skilled intervention to address the above impairments. Continue treatment per established plan of care. Discharge Recommendations:  ? HHPT     Further Equipment Recommendations for Discharge:  N/A     SUBJECTIVE:   Patient stated I had more pain sleeping last night.     OBJECTIVE DATA SUMMARY:   Critical Behavior:  Neurologic State: Appropriate for age  Orientation Level: Appropriate for age, Oriented X4  Functional Mobility Training:  Transfers:  Sit to Stand: Modified independent  Stand to Sit: Modified independent  Balance:  Sitting: Intact  Standing: Intact; With support (SPC)  Standing - Static: Good  Standing - Dynamic : Fair  Ambulation/Gait Training:  Distance (ft): 80 Feet (ft)  Assistive Device: Brace/Splint;Cane, straight;Gait belt  Ambulation - Level of Assistance: Supervision;Modified independent  Gait Abnormalities: Decreased step clearance; Path deviations(1-2x)  Base of Support: Widened  Speed/Maureen: Slow  Step Length: Right shortened;Left shortened  Swing Pattern: Right asymmetrical;Left asymmetrical  Interventions: Safety awareness training;Verbal cues; Visual/Demos  Stairs:  Number of Stairs Trained: 2  Stairs - Level of Assistance: Contact guard assistance  Rail Use: Left   Therapeutic Exercises:   As per protocol:  Elbow/wrist/hand flex/ext/sup/pronation x 10; self assisted R shd flex x 5.  Educated in seated Pendulums for maximal safety with same. Pain:  Pain Scale 1: Numeric (0 - 10)  Pain Intensity 1: 1  Pain Location 1: Shoulder  Pain Orientation 1: Right  Pain Description 1: Aching  Pain Intervention(s) 1: Medication (see MAR)  Activity Tolerance:   Fair   Please refer to the flowsheet for vital signs taken during this treatment.   After treatment:   [x] Patient left in no apparent distress sitting up EOB  [] Patient left in no apparent distress in bed  [x] Call bell left within reach  [x] Nursing notified  [x] Caregiver present-son  [] Bed alarm activated      Candice Lever, PT   Time Calculation: 45 mins

## 2018-02-10 NOTE — PROGRESS NOTES
Problem: Falls - Risk of  Goal: *Absence of Falls  Document Bakari Fall Risk and appropriate interventions in the flowsheet.    Outcome: Progressing Towards Goal  Fall Risk Interventions:  Mobility Interventions: Communicate number of staff needed for ambulation/transfer, Patient to call before getting OOB    Mentation Interventions: Adequate sleep, hydration, pain control    Medication Interventions: Patient to call before getting OOB, Teach patient to arise slowly    Elimination Interventions: Call light in reach, Patient to call for help with toileting needs

## 2018-02-10 NOTE — ADDENDUM NOTE
Addendum  created 02/10/18 1104 by Yovany Mg MD    Anesthesia Event edited, Procedure Event Log accessed, Sign clinical note

## 2018-02-10 NOTE — ANESTHESIA POST-OP FOLLOW UP
Name:  Edward Rivas  Age:  75 y.o. MRN:  [id number]  CSN:  [id number]  :  1950      2/10/2018  11:03 AM    Anesthesia Peripheral Nerve Catheter Post-Op Rounds Note  Daily Management of Continuous Peripheral Nerve/Plexus Catheter    Referring physician: Yesika Maradiaga MD   Patient status post Procedure(s):  EXACTECH REVERSE TOTAL RIGHT SHOULDER ARTHROPLASTY GEN W/SCALENE BLOCK PRN \"SPEC POP\"  on 2018    POST OP Day # 1    Visit Vitals    /68 (BP 1 Location: Left arm, BP Patient Position: Sitting)    Pulse 66    Temp 36.8 °C (98.3 °F)    Resp 17    Ht 5' 9\" (1.753 m)    Wt 138.3 kg (305 lb)    SpO2 100%    BMI 45.04 kg/m2       Patient rates pain 0 at rest and 2 with movement. Peripheral Nerve Catheter ball functioning. Site is not tender and without discharge or redness. No complications, adequate analgesia from peripheral nerve catheter. Continue current postop pain regimen per protocol.       Branden Ruano MD

## 2018-02-10 NOTE — ROUTINE PROCESS
TRANSFER - IN REPORT:    Verbal report received from Steven RN(name) on Lonzell Mealing  being received from PACU(unit) for routine progression of care      Report consisted of patients Situation, Background, Assessment and   Recommendations(SBAR). Information from the following report(s) SBAR, Kardex, Intake/Output and MAR was reviewed with the receiving nurse. Opportunity for questions and clarification was provided. Assessment completed upon patients arrival to unit and care assumed.

## 2018-02-10 NOTE — PROGRESS NOTES
1930 - Bedside report received from Ruben Pate RN. Patient in bed. Pain 0/10.     2205 - Patient in bed at this time. IV to L FA  intact and patent. Sequential compression device bilaterally. TEDs to BLE. Dressing to R shoulder CDI. + CMS. Sling in place. Pt A & O x 4. LS clear, on RA. Abdomen soft, NT and ND. + BS to all 4 quadrants. Denies nausea. Pain 0/10. On PCA Dilaudid. Call light within reach. 0420-Pt denies any needs. Pt had uneventful shift. Uses IS every hour while awake. Pt ambulated with assistance. Pain remained well-controlled with medication. No issues/concerns at this time.  Call bell within reach

## 2019-02-27 NOTE — H&P (VIEW-ONLY)
[facility] Name:  Radha Perry 
MR#:   [id number] :  1950 ACCOUNT #:  [de-identified] ADMIT DATE:  2019 DATE OF SURGERY:  2019 PREOPERATIVE DIAGNOSES:  Ulceration, second toe right foot as well as hammertoe, second toe right foot; Tailor's bunion deformity, right foot. HISTORY OF CHIEF COMPLAINT:  This gentleman presents to [facility] for treatment of his diabetic ulceration, second toe of the right foot in addition to hammertoe deformity that he has had for an extended period of time resulting in a non-resolving ulceration. The patient's ulceration is now resolved and this has taken a great deal of effort to accomplish this. The underlying hammertoe deformity is the problem that has caused the ulceration to recur in the first place. His secondary problem is his Tailor's bunion deformity of the right foot. PAST MEDICAL HISTORY:  Positive for previous osseous infection from the feet, Charcot foot bilateral, considerable arthritic changes of both feet, history of insulin-dependent diabetes mellitus, hypertension, heart disease, and carpal tunnel syndrome. The patient also has a history of sleep apnea, vision disturbance, renal calculi as well as scoliosis. CURRENT MEDICATIONS:  Include 1. Amlodipine 5 mg. 
2.  Aspirin 81 mg. 
3.  Atorvastatin 40 mg. 
4.  Colace 100 mg capsule. 5.  Humalog insulin. 6.  Losartan 25 mg p.o. daily. 7.  Metoprolol succinate 100 mg p.o. daily. 8.  Nitroglycerin as needed for chest pain. 9.  Tramadol 50 mg. 10.  Veltassa 8.5 g oral powder in packet. ALLERGIES:  CODEINE AND PERCOCET. SOCIAL HISTORY:  He is . Alcohol denied. Tobacco denied. FAMILY MEDICAL HISTORY:  Arthritis, cancer, diabetes mellitus, heart disease and hypertension. PHYSICAL EXAMINATION:  Exam reveals a pleasant and cooperative gentleman who is 5 feet 9, 286 pounds, 42.23 BMI. Exam reveals hemorrhagic callus, distal aspect right second toe with arthritic hammertoe deformity, second toe right foot. Pedal pulses are palpable. The patient does have Tailor's bunion deformity, fifth metatarsophalangeal joint right foot. No sign of ulceration or infection. IMAGING:  X-rays demonstrate Tailor's bunion deformity, hammertoe deformity with sagittal plane buckling, second toe right foot. IMPRESSION:  Tailor's bunion deformity, right foot with potential for ulceration. Second toe ulceration history, right foot due to hammertoe deformity and extensive arthritic changes. PLAN: 
1. Medical consultation with primary care. 2.  Under monitored anesthesia care, Tailor's bunionectomy with osteotomy right foot, also hammertoe repair, second toe of the right foot; under monitored anesthesia care, digital fusion second toe to be done as an outpatient. PROGNOSIS:  Recovery from surgery and anesthesia at this time. JONA Gupta/[email or web address] 
D:  02/27/2019 11:22 
T:  02/27/2019 14:55 JOB #:  [id number]

## 2019-02-28 ENCOUNTER — ANESTHESIA EVENT (OUTPATIENT)
Dept: SURGERY | Age: 69
End: 2019-02-28
Payer: MEDICARE

## 2019-03-01 ENCOUNTER — HOSPITAL ENCOUNTER (OUTPATIENT)
Age: 69
Setting detail: OUTPATIENT SURGERY
Discharge: HOME | End: 2019-03-01
Attending: PODIATRIST | Admitting: PODIATRIST
Payer: MEDICARE

## 2019-03-01 ENCOUNTER — ANESTHESIA (OUTPATIENT)
Dept: SURGERY | Age: 69
End: 2019-03-01
Payer: MEDICARE

## 2019-03-01 VITALS
SYSTOLIC BLOOD PRESSURE: 141 MMHG | DIASTOLIC BLOOD PRESSURE: 64 MMHG | RESPIRATION RATE: 13 BRPM | HEART RATE: 60 BPM | WEIGHT: 295.56 LBS | OXYGEN SATURATION: 98 % | BODY MASS INDEX: 43.77 KG/M2 | HEIGHT: 69 IN | TEMPERATURE: 98.5 F

## 2019-03-01 DIAGNOSIS — M51.36 DDD (DEGENERATIVE DISC DISEASE), LUMBAR: Primary | ICD-10-CM

## 2019-03-01 DIAGNOSIS — I15.2 HYPERTENSION DUE TO ENDOCRINE DISORDER: ICD-10-CM

## 2019-03-01 DIAGNOSIS — E11.65 DM HYPEROSMOLARITY TYPE II, UNCONTROLLED (HCC): ICD-10-CM

## 2019-03-01 DIAGNOSIS — E11.00 DM HYPEROSMOLARITY TYPE II, UNCONTROLLED (HCC): ICD-10-CM

## 2019-03-01 DIAGNOSIS — E10.42 TYPE 1 DIABETES MELLITUS WITH DIABETIC POLYNEUROPATHY (HCC): ICD-10-CM

## 2019-03-01 LAB
GLUCOSE BLD STRIP.AUTO-MCNC: 111 MG/DL (ref 70–110)
GLUCOSE BLD STRIP.AUTO-MCNC: 129 MG/DL (ref 70–110)
POTASSIUM SERPL-SCNC: 5.2 MMOL/L (ref 3.5–5.5)

## 2019-03-01 PROCEDURE — 77030025006 HC BUR STRY -C: Performed by: PODIATRIST

## 2019-03-01 PROCEDURE — 77030002933 HC SUT MCRYL J&J -A: Performed by: PODIATRIST

## 2019-03-01 PROCEDURE — 76060000033 HC ANESTHESIA 1 TO 1.5 HR: Performed by: PODIATRIST

## 2019-03-01 PROCEDURE — 77030020268 HC MISC GENERAL SUPPLY: Performed by: PODIATRIST

## 2019-03-01 PROCEDURE — 77030020782 HC GWN BAIR PAWS FLX 3M -B: Performed by: PODIATRIST

## 2019-03-01 PROCEDURE — 76010000149 HC OR TIME 1 TO 1.5 HR: Performed by: PODIATRIST

## 2019-03-01 PROCEDURE — 84132 ASSAY OF SERUM POTASSIUM: CPT

## 2019-03-01 PROCEDURE — 82962 GLUCOSE BLOOD TEST: CPT

## 2019-03-01 PROCEDURE — 74011250636 HC RX REV CODE- 250/636

## 2019-03-01 PROCEDURE — 76210000021 HC REC RM PH II 0.5 TO 1 HR: Performed by: PODIATRIST

## 2019-03-01 PROCEDURE — 74011250636 HC RX REV CODE- 250/636: Performed by: PODIATRIST

## 2019-03-01 PROCEDURE — 77030002916 HC SUT ETHLN J&J -A: Performed by: PODIATRIST

## 2019-03-01 PROCEDURE — 77030032490 HC SLV COMPR SCD KNE COVD -B: Performed by: PODIATRIST

## 2019-03-01 PROCEDURE — 77030016865 HC CATH PAIN PMP/Q2 AVNM -C: Performed by: PODIATRIST

## 2019-03-01 PROCEDURE — C1713 ANCHOR/SCREW BN/BN,TIS/BN: HCPCS | Performed by: PODIATRIST

## 2019-03-01 PROCEDURE — 36415 COLL VENOUS BLD VENIPUNCTURE: CPT

## 2019-03-01 PROCEDURE — 77030006788 HC BLD SAW OSC STRY -B: Performed by: PODIATRIST

## 2019-03-01 PROCEDURE — 88307 TISSUE EXAM BY PATHOLOGIST: CPT

## 2019-03-01 PROCEDURE — 74011000250 HC RX REV CODE- 250: Performed by: PODIATRIST

## 2019-03-01 PROCEDURE — 88311 DECALCIFY TISSUE: CPT

## 2019-03-01 DEVICE — IMPLANTABLE DEVICE: Type: IMPLANTABLE DEVICE | Site: FOOT | Status: FUNCTIONAL

## 2019-03-01 RX ORDER — SODIUM CHLORIDE 0.9 % (FLUSH) 0.9 %
5-40 SYRINGE (ML) INJECTION AS NEEDED
Status: DISCONTINUED | OUTPATIENT
Start: 2019-03-01 | End: 2019-03-01 | Stop reason: HOSPADM

## 2019-03-01 RX ORDER — SODIUM CHLORIDE, SODIUM LACTATE, POTASSIUM CHLORIDE, CALCIUM CHLORIDE 600; 310; 30; 20 MG/100ML; MG/100ML; MG/100ML; MG/100ML
125 INJECTION, SOLUTION INTRAVENOUS CONTINUOUS
Status: DISCONTINUED | OUTPATIENT
Start: 2019-03-01 | End: 2019-03-01 | Stop reason: HOSPADM

## 2019-03-01 RX ORDER — DEXTROSE 50 % IN WATER (D50W) INTRAVENOUS SYRINGE
25-50 AS NEEDED
Status: DISCONTINUED | OUTPATIENT
Start: 2019-03-01 | End: 2019-03-01 | Stop reason: HOSPADM

## 2019-03-01 RX ORDER — INSULIN LISPRO 100 [IU]/ML
INJECTION, SOLUTION INTRAVENOUS; SUBCUTANEOUS ONCE
Status: DISCONTINUED | OUTPATIENT
Start: 2019-03-01 | End: 2019-03-01 | Stop reason: HOSPADM

## 2019-03-01 RX ORDER — SODIUM CHLORIDE 0.9 % (FLUSH) 0.9 %
5-40 SYRINGE (ML) INJECTION EVERY 8 HOURS
Status: DISCONTINUED | OUTPATIENT
Start: 2019-03-01 | End: 2019-03-01 | Stop reason: HOSPADM

## 2019-03-01 RX ORDER — BUPIVACAINE HYDROCHLORIDE 5 MG/ML
INJECTION, SOLUTION EPIDURAL; INTRACAUDAL AS NEEDED
Status: DISCONTINUED | OUTPATIENT
Start: 2019-03-01 | End: 2019-03-01 | Stop reason: HOSPADM

## 2019-03-01 RX ORDER — PROPOFOL 10 MG/ML
INJECTION, EMULSION INTRAVENOUS AS NEEDED
Status: DISCONTINUED | OUTPATIENT
Start: 2019-03-01 | End: 2019-03-01 | Stop reason: HOSPADM

## 2019-03-01 RX ORDER — PROPOFOL 10 MG/ML
INJECTION, EMULSION INTRAVENOUS
Status: DISCONTINUED | OUTPATIENT
Start: 2019-03-01 | End: 2019-03-01 | Stop reason: HOSPADM

## 2019-03-01 RX ORDER — LIDOCAINE HYDROCHLORIDE 10 MG/ML
INJECTION INFILTRATION; PERINEURAL AS NEEDED
Status: DISCONTINUED | OUTPATIENT
Start: 2019-03-01 | End: 2019-03-01 | Stop reason: HOSPADM

## 2019-03-01 RX ORDER — CEFAZOLIN SODIUM 2 G/50ML
2 SOLUTION INTRAVENOUS ONCE
Status: DISCONTINUED | OUTPATIENT
Start: 2019-03-01 | End: 2019-03-01 | Stop reason: DRUGHIGH

## 2019-03-01 RX ADMIN — PROPOFOL 60 MG: 10 INJECTION, EMULSION INTRAVENOUS at 13:49

## 2019-03-01 RX ADMIN — SODIUM CHLORIDE, SODIUM LACTATE, POTASSIUM CHLORIDE, AND CALCIUM CHLORIDE: 600; 310; 30; 20 INJECTION, SOLUTION INTRAVENOUS at 14:16

## 2019-03-01 RX ADMIN — CEFAZOLIN SODIUM 3 G: 10 INJECTION, POWDER, FOR SOLUTION INTRAVENOUS at 13:42

## 2019-03-01 RX ADMIN — PROPOFOL 50 MCG/KG/MIN: 10 INJECTION, EMULSION INTRAVENOUS at 13:54

## 2019-03-01 RX ADMIN — SODIUM CHLORIDE, SODIUM LACTATE, POTASSIUM CHLORIDE, AND CALCIUM CHLORIDE 125 ML/HR: 600; 310; 30; 20 INJECTION, SOLUTION INTRAVENOUS at 11:12

## 2019-03-01 NOTE — ANESTHESIA POSTPROCEDURE EVALUATION
Post-Anesthesia Evaluation and Assessment Cardiovascular Function/Vital Signs Visit Vitals /64 Pulse 60 Temp 36.9 °C (98.5 °F) Resp 13 Ht 5' 9\" (1.753 m) Wt 134.1 kg (295 lb 9 oz) SpO2 98% BMI 43.65 kg/m² Patient is status post Procedure(s): HAMMERTOE REPAIR SECOND TOE RIGHT FOOT 
TAILORS BUNIONECTOMY WITH OSTEOTOMY RIGHT FOOT WITH MINI C-ARM, \"SPEC POP\". Nausea/Vomiting: Controlled. Postoperative hydration reviewed and adequate. Pain: 
Pain Scale 1: Numeric (0 - 10) (03/01/19 1533) Pain Intensity 1: 0 (03/01/19 1533) Managed. Neurological Status:  
Neuro (WDL): Exceptions to WDL (03/01/19 1533) At baseline. Mental Status and Level of Consciousness: Baseline and stable. Pulmonary Status:  
O2 Device: Room air (03/01/19 1533) Adequate oxygenation and airway patent. Complications related to anesthesia: None Post-anesthesia assessment completed. No concerns. Patient has met all discharge requirements.  
 
Signed By: Jose Felix MD

## 2019-03-01 NOTE — INTERVAL H&P NOTE
H&P Update: 
Victorina Mcgrath was seen and examined. Patient identified by surgeon; surgical site was confirmed by patient and surgeon.  
 
Signed By: Louise Cruz DPM   
 March 1, 2019 1:02 PM

## 2019-03-01 NOTE — PERIOP NOTES
Reviewed PTA medication list with patient/caregiver and patient/caregiver denies any additional medications. Patient admits to having a responsible adult care for them for at least 24 hours after surgery. 
  
Dual skin assessment completed by Jack FLAHERTY and Kelvin Cedeño RN.

## 2019-03-01 NOTE — ANESTHESIA PREPROCEDURE EVALUATION
Anesthetic History No history of anesthetic complications Review of Systems / Medical History Patient summary reviewed, nursing notes reviewed and pertinent labs reviewed Pulmonary Sleep apnea Neuro/Psych Within defined limits Cardiovascular Hypertension CAD Exercise tolerance: >4 METS 
  
GI/Hepatic/Renal 
Within defined limits Endo/Other Diabetes Morbid obesity and arthritis Other Findings Physical Exam 
 
Airway Mallampati: III 
TM Distance: 4 - 6 cm Neck ROM: decreased range of motion Mouth opening: Normal 
 
 Cardiovascular Regular rate and rhythm,  S1 and S2 normal,  no murmur, click, rub, or gallop Rhythm: regular Rate: normal 
 
 
 
 Dental 
No notable dental hx Pulmonary Breath sounds clear to auscultation Abdominal 
GI exam deferred Other Findings Anesthetic Plan ASA: 3 Anesthesia type: MAC Anesthetic plan and risks discussed with: Patient and Family

## 2019-03-01 NOTE — DISCHARGE INSTRUCTIONS
Patient armband removed and shredded       Bunionectomy: What to Expect at [address] had bunion surgery to remove a lump of bone (bunion) from the joint where your big toe joins your foot, and to straighten your big toe. You will have pain and swelling that slowly improves in the 6 weeks after surgery. You may have some minor pain and swelling that lasts as long as 6 months to a year. After surgery, you will need to wear a cast or a special type of shoe to protect your toe and to keep it in the right position for at least 3 to 6 weeks. After some types of surgeries, a cast or special shoe is used for a few months. Your doctor will remove your stitches or sutures about 2 weeks after the surgery. If you have removable pins holding your toe in place, they are usually removed in about 4 to 6 weeks. This care sheet gives you a general idea about how long it will take for you to recover. But each person recovers at a different pace. Follow the steps below to get better as quickly as possible. How can you care for yourself at home? Activity    · Rest when you feel tired. Getting enough sleep will help you recover.     · Ask your doctor when you can drive again.     · You may shower, unless your doctor tells you not to. Keep the bandage dry. If the bandage has been removed, you can wash the area with warm water and soap. Pat the area dry.     · You will probably need to take several weeks off from work. How much time you need to take off depends on the type of work you do and the extent of your surgery.     · You may need to avoid heavy lifting for 3 to 8 weeks or longer, depending on the type of surgery you had.     · You may need to do regular rehabilitation (rehab) exercises to strengthen your foot and improve movement. Start out slowly, and follow your doctor's instructions. Diet    · You can eat your normal diet.  If your stomach is upset, try bland, low-fat foods like plain rice, broiled chicken, toast, and yogurt.     · You may notice that your bowel movements are not regular right after your surgery. This is common. Try to avoid constipation and straining with bowel movements. You may want to take a fiber supplement every day. If you have not had a bowel movement after a couple of days, ask your doctor about taking a mild laxative. Medicines    · Your doctor will tell you if and when you can restart your medicines. He or she will also give you instructions about taking any new medicines.     · If you take blood thinners, such as warfarin (Coumadin), clopidogrel (Plavix), or aspirin, be sure to talk to your doctor. He or she will tell you if and when to start taking those medicines again. Make sure that you understand exactly what your doctor wants you to do.     · Be safe with medicines. Take pain medicines exactly as directed. ? If the doctor gave you a prescription medicine for pain, take it as prescribed. ? If you are not taking a prescription pain medicine, ask your doctor if you can take an over-the-counter medicine.     · If your doctor prescribed antibiotics, take them as directed. Do not stop taking them just because you feel better. You need to take the full course of antibiotics.     · If you think your pain medicine is making you sick to your stomach:  ? Take your medicine after meals (unless your doctor has told you not to). ? Ask your doctor for a different pain medicine. Incision care    · You will leave the hospital with bandages holding your toe in the correct position. Your doctor will probably remove the bandages after several days. Or your doctor may have you remove your bandages at home. Do not touch the surgery area. Keep it dry.     · Do not soak your foot until your doctor says it is okay. Ice and elevation    · For pain and swelling, put ice or a cold pack on your foot for 10 to 20 minutes each hour.  Put a thin cloth between the ice and your skin.     · Prop up your foot and leg on a pillow when you ice it or anytime you sit or lie down during the next 3 days. Try to keep it above the level of your heart. This will help reduce swelling. Follow-up care is a key part of your treatment and safety. Be sure to make and go to all appointments, and call your doctor if you are having problems. It's also a good idea to know your test results and keep a list of the medicines you take. When should you call for help? Call 911 anytime you think you may need emergency care. For example, call if:    · You passed out (lost consciousness).     · You have sudden chest pain and shortness of breath, or you cough up blood.     · You have severe trouble breathing.    Call your doctor now or seek immediate medical care if:    · Your foot or toe is cool or pale or changes color.     · You have numbness, tingling, or less feeling in your foot or toes.     · You have pain that does not get better after you take pain medicine.     · You have loose stitches, or your incision comes open.     · Bright red blood has soaked through the bandage over your incision.     · You have signs of infection, such as:  ? Increased pain, swelling, warmth, or redness. ? Red streaks leading from the incision. ? Pus draining from the incision. ? Swollen lymph nodes in your neck, armpits, or groin. ? A fever.    Watch closely for any changes in your health, and be sure to contact your doctor if:    · You do not have a bowel movement after taking a laxative. Where can you learn more? Go to http://mery-nohemi.info/. Enter 0699 472 39 [id number] in the search box to learn more about \"Bunionectomy: What to Expect at Home. \"  Current as of: September 20, 2018  Content Version: 11.9  © 6292-8573 [employer]. Care instructions adapted under license by [employer] (which disclaims liability or warranty for this information).  If you have questions about a medical condition or this instruction, always ask your healthcare professional. [place] [address] any warranty or liability for your use of this information. Surgery for Hammer Toe: What to Expect at [address] had hammer toe surgery to straighten a curled toe. After your surgery, your toe may be stiff, red, and swollen. Depending on the type of surgery you had for your hammer toe, these symptoms can last for weeks to months. They will slowly get better with time. After surgery, you will need to wear a special type of shoe to protect your toe and to keep it in the right position for 3 to 6 weeks. Your doctor will remove your stitches or sutures about 2 weeks after the surgery. If your doctor put a temporary pin or other device in place to keep your toe straight while it heals, it will be removed 3 to 6 weeks after surgery. This care sheet gives you a general idea about how long it will take for you to recover. But each person recovers at a different pace. Follow the steps below to get better as quickly as possible. How can you care for yourself at home? Activity    · Rest when you feel tired. Getting enough sleep will help you recover.     · Try to walk each day if you are able. Start by walking a little more than you did the day before. Bit by bit, increase the amount you walk. Walking boosts blood flow and helps prevent pneumonia and constipation.     · It may be as long as 4 to 6 weeks before you can drive. Ask your doctor when you can drive again.     · You may shower, unless your doctor tells you not to. Keep the bandage dry. If the bandage has been removed, you can wash the area with plain warm water. Pat the area dry.     · You will probably need to take at least 1 to 4 weeks off work, depending on your job. It will be 3 to 6 weeks or longer before you can stand or walk for long periods. Diet    · You can eat your normal diet.  If your stomach is upset, try bland, low-fat foods like plain rice, broiled chicken, toast, and yogurt.     · You may notice that your bowel movements are not regular right after your surgery. This is common. Try to avoid constipation and straining with bowel movements. You may want to take a fiber supplement every day. If you have not had a bowel movement after a couple of days, ask your doctor about taking a mild laxative. Medicines    · Your doctor will tell you if and when you can restart your medicines. He or she will also give you instructions about taking any new medicines.     · If you take blood thinners, such as warfarin (Coumadin), clopidogrel (Plavix), or aspirin, be sure to talk to your doctor. He or she will tell you if and when to start taking those medicines again. Make sure that you understand exactly what your doctor wants you to do.     · Be safe with medicines. Take pain medicines exactly as directed. ? If the doctor gave you a prescription medicine for pain, take it as prescribed. ? If you are not taking a prescription pain medicine, ask your doctor if you can take an over-the-counter medicine.     · If your doctor prescribed antibiotics, take them as directed. Do not stop taking them just because you feel better. You need to take the full course of antibiotics.     · If you think your pain medicine is making you sick to your stomach:  ? Take your medicine after meals (unless your doctor has told you not to). ? Ask your doctor for a different pain medicine. Incision care    · You will leave the hospital with bandages holding your toe in the correct position. Your doctor will probably remove the bandages after several days. Or your doctor may have you remove your bandages at home. Do not touch the surgery area. Keep it dry.     · Do not soak your foot until your doctor says it is okay. Ice and elevation    · For pain and swelling, put ice or a cold pack on your foot for 10 to 20 minutes each hour.  Put a thin cloth between the ice and your skin.     · Prop up your foot and leg on a pillow when you ice it or anytime you sit or lie down during the next 3 days. Try to keep it above the level of your heart. This will help reduce swelling. Follow-up care is a key part of your treatment and safety. Be sure to make and go to all appointments, and call your doctor if you are having problems. It's also a good idea to know your test results and keep a list of the medicines you take. When should you call for help? Call 911 anytime you think you may need emergency care. For example, call if:    · You passed out (lost consciousness).     · You have sudden chest pain and shortness of breath, or you cough up blood.     · You have severe trouble breathing.    Call your doctor now or seek immediate medical care if:    · Your foot or toes are cool or pale or change color.     · You have numbness, tingling, or less feeling in your foot or toe.     · You have pain that does not get better after you take pain medicine.     · You have loose stitches, or your incision comes open.     · Bright red blood has soaked through the bandage over your incision.     · You have signs of infection, such as:  ? Increased pain, swelling, warmth, or redness. ? Red streaks leading from the incision. ? Pus draining from the incision. ? Swollen lymph nodes in your neck, armpits, or groin. ? A fever.    Watch closely for any changes in your health, and be sure to contact your doctor if:    · You do not have a bowel movement after taking a laxative. Where can you learn more? Go to http://mery-nohemi.info/. Enter C392 in the search box to learn more about \"Surgery for Hammer Toe: What to Expect at Home. \"  Current as of: September 20, 2018  Content Version: 11.9  © 4927-3466 [employer]. Care instructions adapted under license by [employer] (which disclaims liability or warranty for this information).  If you have questions about a medical condition or this instruction, always ask your healthcare professional. [place] [address] any warranty or liability for your use of this information. DISCHARGE SUMMARY from Nurse    PATIENT INSTRUCTIONS:    After general anesthesia or intravenous sedation, for 24 hours or while taking prescription Narcotics:  · Limit your activities  · Do not drive and operate hazardous machinery  · Do not make important personal or business decisions  · Do  not drink alcoholic beverages  · If you have not urinated within 8 hours after discharge, please contact your surgeon on call. Report the following to your surgeon:  · Excessive pain, swelling, redness or odor of or around the surgical area  · Temperature over 100.5  · Nausea and vomiting lasting longer than 4 hours or if unable to take medications  · Any signs of decreased circulation or nerve impairment to extremity: change in color, persistent  numbness, tingling, coldness or increase pain  · Any questions    What to do at Home:  Recommended activity: Activity as tolerated and no driving for today and Ambulate in house,     If you experience any of the following symptoms above, please follow up with Dr. Indu Hernandez. *  Please give a list of your current medications to your Primary Care Provider. *  Please update this list whenever your medications are discontinued, doses are      changed, or new medications (including over-the-counter products) are added. *  Please carry medication information at all times in case of emergency situations. These are general instructions for a healthy lifestyle:    No smoking/ No tobacco products/ Avoid exposure to second hand smoke  Surgeon General's Warning:  Quitting smoking now greatly reduces serious risk to your health.     Obesity, smoking, and sedentary lifestyle greatly increases your risk for illness    A healthy diet, regular physical exercise & weight monitoring are important for maintaining a healthy lifestyle    You may be retaining fluid if you have a history of heart failure or if you experience any of the following symptoms:  Weight gain of 3 pounds or more overnight or 5 pounds in a week, increased swelling in our hands or feet or shortness of breath while lying flat in bed. Please call your doctor as soon as you notice any of these symptoms; do not wait until your next office visit. Recognize signs and symptoms of STROKE:    F-face looks uneven    A-arms unable to move or move unevenly    S-speech slurred or non-existent    T-time-call 911 as soon as signs and symptoms begin-DO NOT go       Back to bed or wait to see if you get better-TIME IS BRAIN. Warning Signs of HEART ATTACK     Call 911 if you have these symptoms:   Chest discomfort. Most heart attacks involve discomfort in the center of the chest that lasts more than a few minutes, or that goes away and comes back. It can feel like uncomfortable pressure, squeezing, fullness, or pain.  Discomfort in other areas of the upper body. Symptoms can include pain or discomfort in one or both arms, the back, neck, jaw, or stomach.  Shortness of breath with or without chest discomfort.  Other signs may include breaking out in a cold sweat, nausea, or lightheadedness. Don't wait more than five minutes to call 911 - MINUTES MATTER! Fast action can save your life. Calling 911 is almost always the fastest way to get lifesaving treatment. Emergency Medical Services staff can begin treatment when they arrive -- up to an hour sooner than if someone gets to the hospital by car. The discharge information has been reviewed with the patient and caregiver. The patient and caregiver verbalized understanding.   Discharge medications reviewed with the patient and caregiver and appropriate educational materials and side effects teaching were provided.   ___________________________________________________________________________________________________________________________________

## 2019-03-01 NOTE — BRIEF OP NOTE
BRIEF OPERATIVE NOTE Date of Procedure: 3/1/2019 Preoperative Diagnosis: 2ND RIGHT HAMMERTOE, OA RIGHT FOOT Postoperative Diagnosis: 2ND RIGHT HAMMERTOE, OA RIGHT FOOT Procedure(s): HAMMERTOE REPAIR SECOND TOE RIGHT FOOT 
TAILORS BUNIONECTOMY WITH OSTEOTOMY RIGHT FOOT WITH MINI C-ARM, \"SPEC POP\" Surgeon(s) and Role: Courtney Calabrese DPM - Primary Surgical Assistant: none Surgical Staff: 
Circ-1: Nohemi Hodgkins, RN 
Circ-Relief: Alvino Cole RN Scrub Tech-1: Ag Chin Scrub RN-1: Franco Valdivia RN Event Time In Time Out Incision Start 1400 Incision Close 1443 Anesthesia: MAC Estimated Blood Loss: 10cc Specimens:  
ID Type Source Tests Collected by Time Destination 1 : bone Fresh Foot, Right  [place], [place] 3/1/2019 1421 Pathology Findings: OA , hammertoe 2nd R foot tailors bunion and gouty tophi 5th MTPJ R foot Complications: none Implants:  
Implant Name Type Inv. Item Serial No.  Lot No. LRB No. Used Action KIT PIP DART 2.5X30MM 10 DEG -- STRL - [id number]  KIT PIP DART 2.5X30MM 10 DEG -- STRL  [employer] [id number] Right 1 Implanted

## 2019-03-02 NOTE — OP NOTES
[facility] [place]  OPERATIVE REPORT    Name:  Teresita Abbasi  MR#:   [id number]  :  1950  ACCOUNT #:  [de-identified]  DATE OF SERVICE:  2019    PREOPERATIVE DIAGNOSES:  1.  Osteoarthritis of second toe with ulceration. 2.  History of second toe, right foot hammertoe. 3.  Tailor's bunion, right foot, with possible gouty arthritis, toe #5, right foot. POSTOPERATIVE DIAGNOSES:  1.  Osteoarthritis of second toe with ulceration. 2.  History of second toe, right foot hammertoe. 3.  Tailor's bunion, right foot, with possible gouty arthritis, toe #5, right foot. PROCEDURE PERFORMED:  1. Hammertoe repair with digital fusion, second toe, right foot. 2.  Tailor's bunionectomy with excision of gouty tophi, fifth metatarsal phalangeal joint, right foot. 3.  Instillation of local anesthetic drip system, ON-Q pain pump, right foot. SURGEON:  Ibeth Dalton DPM    ANESTHESIOLOGIST:  DEMOND Hale MD    ANESTHESIA:  MAC.    COMPLICATIONS:  none  SPECIMENS REMOVED: Bone and suspected gouty tophi    IMPLANTS:  None   ESTIMATED BLOOD LOSS:  10cc    HEMOSTASIS:  Right ankle tourniquet. DESCRIPTION OF PROCEDURE:  The patient was given prophylactic antibiotics preoperatively. The patient was brought to the operating room, placed on the operating room table in the supine position. The patient had previously been greeted in the preop holding area and was marked in the area for prospective surgery for the right foot. The patient had received prophylactic antibiotics preoperatively. The patient was prepped and draped in the usual septic technique for surgery once he was on the operating room table. A local anesthetic block was performed after the patient was given intravenous sedation. The patient had sterile prep and drape performed. The limb was elevated and exsanguinated. The tourniquet was then inflated to 250 mmHg.   The patient's limb was then returned to the horizontal and the operative procedures were then begun. Fusion, second toe, right foot:  Two transverse semi-elliptical incisions were made on the dorsal aspect of the second toe for the right foot. The skin incision was deepened in the same plane. Careful attention was paid to avoid injuring the vital neurovascular structures present in the wound. The patient was significantly rotated and with the external rotation because of his hip and considerable arthritic conditions with his foot, ankle, and lower leg. The incision was made through the extensor tendon, and the head of the proximal phalanx was then resected. To help perform digital fusion, the middle phalanx was then denuded of cartilage and then the medullary  holes were drilled into the proximal phalanx as well as the middle phalanx for acceptance of an Arthrex Dart implant device. The Arthrex Dart implant device was then implanted, checked on C-arm, and was noted to be in excellent position. The long extensor tendon was then closed using 3-0 Monocryl sutures, skin was closed with 5-0 nylon suture and Steri-Strips. Tailor's bunionectomy, fifth metatarsal, right foot:  A linear incision was made on the dorsal lateral aspect of the right foot. Skin incision was deepened in the same plane. Careful attention was paid to avoid injuring the vital neurovascular structures present in the wound. A joint capsule incision was then made and considerable abundant gouty tophi material was encountered. This was comprising the majority of the Tailor's bunion deformity. At this point, I elected to remove approximately 80% to 90% of the palpable tophi. Care was taken to avoid injuring the digital vessels going to the lateral aspect of the fifth toe. Once the further debridement of this area was completed, then this was heavily irrigated with saline solution and suctioned.   The lateral aspect of the fifth metatarsal head then had a Tailor's bunionectomy done in this area to reduce the bony prominence present there. The long extensor tendon was noted to be intact. The joint capsules were then closed with 3-0 Monocryl suture and 5-0 nylon was used to realign the skin and close it. The skin incision was then reinforced with Steri-Strips. Instillation of local anesthetic drip system, right foot:  A local anesthetic drip system, ON-Q pain pump was installed for the right foot to drip Marcaine 0.5% plain at a rate of 1 mL per hour over the next several days. The bandages were then applied. The tourniquet had been released mid procedure due to some venous backflow. The patient did have some considerable calcification of the vessels that was inhibiting good hemostasis without the tourniquet. The surgery proceeded actually better without the backflow from the venous congestion. The bandages were completed, and the patient was then transported from the operating room to the recovery room. The patient's case was then discussed with his son, Jney Irwin, in the family waiting area. The instructions, expectations, and postoperative care were all reviewed. His follow up is for Monday, 03/04/2019.   Prognosis for recovery from surgery and anesthesia at this time is good    JONA [id number]  D:  03/01/2019 15:18  T:  03/02/2019 9:47  JOB #:  [id number]  CC:  Alva Peguero DPM

## 2020-05-06 NOTE — INTERVAL H&P NOTE
DRIVE-THRU INR was performed along with telephonic management due to COVID-19 ACTION PLAN.  The patient consents to a telephone visit.  Patient states they are Delbert Mahoney and are currently located at home during the telephone visit.  Time spent talking with patient / family / caregiver during today's call(s): 5 minutes.  Anticoagulation Progress Note      Indication: Atrial Fibrillation ([id number])  Goal INR: 2.0-3.0  Duration: indefinite       Assessment  High-Risk Medications: n/a  Significant Findings: n/a  Hospitalizations / Procedures: n/a  Vitamin K goal: 1 cup/wk  Other: n/a    Plan (7.5mg tablets)  INR Result: 2.8  The INR result for today is therapeutic based on the INR goal 2.0-3.0.  Etiology: n/a  Recommended Dose:Continue 11.25mgThSa;7.5mgROW(60mg/wk)  Follow-Up: 5 weeks- 6/10/20  Comments: n/a    Counseling  Counseled about signs/symptoms of thrombosis and/or stroke and when to seek emergent care  Stressed importance of compliance with exact recommended dosage regimen  Instructed to notify clinic about medication changes or health status changes  Instructed to notify clinic about scheduled procedures  Discussed risk of thrombosis and/or bleeding with inappropriate anticoagulant use    Patient (or family/caregiver) verbalized understanding and agreement with plan.   H&P Update:  Nahum Richards was seen and examined. History and physical has been reviewed. The patient has been examined. There have been no significant clinical changes since the completion of the originally dated History and Physical.  Patient identified by surgeon; surgical site was confirmed by patient and surgeon.     Signed By: Ana Hodges MD     February 9, 2018 7:28 AM

## 2021-09-21 NOTE — DISCHARGE SUMMARY
Patient: Fatoumata Garrett               Sex: male          DOA: 2/9/2018         YOB: 1950      Age:  75 y.o.        LOS:  LOS: 1 day        HPI:     Fatoumata Garrett is a 75 y.o. male who complained of pain and weakness in the right shoulder, unresponsive to NSAID's and interfering with daily activities including sleep. X-rays showed severe gleno-humeral degenerative arthritis and an MRI shows a massive, unrepairable rotator cuff tear. The patient was then admitted for a right reverse total  shoulder arthroplasty. The surgery was then performed on 2/0/7184  with no complications. In the post op period good progress was made with stable vital signs. The neurological exam in the operative arm was normal. Fatoumata Garrett was cleared for discharge home with the plan to be seen in the office in one week. No

## (undated) DEVICE — Device

## (undated) DEVICE — SYR 10ML CTRL LR LCK NSAF LF --

## (undated) DEVICE — GOWN,SIRUS,NONRNF,SETINSLV,XL,20/CS: Brand: MEDLINE

## (undated) DEVICE — PRECISION (9.0 X 0.51 X 25.0MM)

## (undated) DEVICE — STRIP SKIN CLSR W0.25XL4IN WHT SPUNBOUND FBR NYL HI ADH

## (undated) DEVICE — SUTURE MCRYL SZ 3-0 L27IN ABSRB UD L26MM SH 1/2 CIR Y416H

## (undated) DEVICE — NEEDLE HYPO 21GA L1.5IN INTRAMUSCULAR S STL LATCH BVL UP

## (undated) DEVICE — (D)PREP SKN CHLRAPRP APPL 26ML -- CONVERT TO ITEM 371833

## (undated) DEVICE — DEVON™ KNEE AND BODY STRAP 60" X 3" (1.5 M X 7.6 CM): Brand: DEVON

## (undated) DEVICE — UNDERCAST PADDING: Brand: DEROYAL

## (undated) DEVICE — BANDAGE COMPR W4INXL5YD ELAS CLP CLSR

## (undated) DEVICE — NEEDLE HYPO 18GA L1.5IN PNK POLYPR HUB S STL THN WALL FILL

## (undated) DEVICE — SUTURE FIBERWIRE SZ 2 L38IN 97CM NONABSB BLU L26.5MM W/TWO TAPERED NEEDLES  1/2 CIRCLE AR7205

## (undated) DEVICE — NEEDLE HYPO 27GA L1.25IN GRY POLYPR HUB S STL REG BVL STR

## (undated) DEVICE — SOLUTION IV 500ML 0.9% SOD CHL FLX CONT

## (undated) DEVICE — BANDAGE COMPR SGL LAYERED CLP CLSR ELAS 15FT LEN 6IN W

## (undated) DEVICE — BLADE SAW 1.27X90 MM FOR LG BNE [KMS2513PM62STE] [KOMET MEDICAL]

## (undated) DEVICE — PLUS HANDPIECE WITH SPRAY TIP: Brand: SURGILAV

## (undated) DEVICE — BANDAGE COMPR 9 FTX4 IN SMOOTH COMFORTABLE SYNTH ESMRK LF

## (undated) DEVICE — SOLUTION SCRB 4OZ 10% PVP I POVIDONE IOD TOP PAINT EXIDINE

## (undated) DEVICE — MASTISOL ADHESIVE LIQ 2/3ML

## (undated) DEVICE — 3M™ TEGADERM™ TRANSPARENT FILM DRESSING FRAME STYLE, 1626W, 4 IN X 4-3/4 IN (10 CM X 12 CM), 50/CT 4CT/CASE: Brand: 3M™ TEGADERM™

## (undated) DEVICE — TOTAL KNEE PACK-LF: Brand: MEDLINE INDUSTRIES, INC.

## (undated) DEVICE — CATH IV AUTOGRD ORN 14GA 45MM -- INSYTE-N

## (undated) DEVICE — DRAPE TWL SURG 16X26IN BLU ORB04] ALLCARE INC]

## (undated) DEVICE — NON-ADHERENT STRIPS,OIL EMULSION: Brand: CURITY

## (undated) DEVICE — DRESSING FOAM SELF ADH 20X10 CM ABSORBENT MEPILEX BORDER

## (undated) DEVICE — REM POLYHESIVE ADULT PATIENT RETURN ELECTRODE: Brand: VALLEYLAB

## (undated) DEVICE — INTENDED FOR TISSUE SEPARATION, AND OTHER PROCEDURES THAT REQUIRE A SHARP SURGICAL BLADE TO PUNCTURE OR CUT.: Brand: BARD-PARKER ® CARBON RIB-BACK BLADES

## (undated) DEVICE — ASTOUND STANDARD SURGICAL GOWN, XXL: Brand: CONVERTORS

## (undated) DEVICE — BANDAGE COMPR EXSANGUATION SGL LAYERED NO CLSR 9FT LEN 4IN W

## (undated) DEVICE — SUTURE MCRYL SZ 5-0 L18IN ABSRB UD L19MM PS-2 3/8 CIR PRIM Y495G

## (undated) DEVICE — SUTURE STRATAFIX SZ 3-0 L30CM NONABSORBABLE UD L19MM FS-2 SXMP2B408

## (undated) DEVICE — GOWN,AURORA,NONRNF,XL,30/CS: Brand: MEDLINE

## (undated) DEVICE — 3M™ STERI-DRAPE™ U-DRAPE 1015: Brand: STERI-DRAPE™

## (undated) DEVICE — SUT VCRL + 2-0 36IN CT1 UD --

## (undated) DEVICE — AMD ANTIMICROBIAL DRAIN SPONGES, 6 PLY, 0.2% POLYHEXAMETHYLENE BIGUANIDE HCI (PHMB): Brand: EXCILON

## (undated) DEVICE — SYR LR LCK 1ML GRAD NSAF 30ML --

## (undated) DEVICE — SUTURE ETHLN SZ 5-0 L18IN NONABSORBABLE UD L13MM P-3 3/8 690G

## (undated) DEVICE — SHOULDER REV IMPL -- S4 BSV SC: Type: IMPLANTABLE DEVICE | Site: SHOULDER | Status: FUNCTIONAL

## (undated) DEVICE — SUTURE VCRL 0 L27IN ABSRB OS-6 BRAID COAT UD J534H

## (undated) DEVICE — SYRINGE MED 20ML STD CLR PLAS LUERLOCK TIP N CTRL DISP

## (undated) DEVICE — CURITY NON-ADHERENT STRIPS: Brand: CURITY

## (undated) DEVICE — STRIP,CLOSURE,WOUND,MEDI-STRIP,1/2X4: Brand: MEDLINE

## (undated) DEVICE — SUTURE ETHLN SZ 5-0 L18IN NONABSORBABLE BLK L16MM PS-3 3/8 1668G

## (undated) DEVICE — SOL IRR STRL H2O 1500ML BTL --

## (undated) DEVICE — GAUZE SPONGES,12 PLY: Brand: CURITY

## (undated) DEVICE — SINGLE PORT MANIFOLD: Brand: NEPTUNE 2

## (undated) DEVICE — SUTURE FIBERWIRE SZ 2 W/ TAPERED NEEDLE BLUE L38IN NONABSORB BLU L26.5MM 1/2 CIRCLE AR7200

## (undated) DEVICE — SYR 5ML 1/5 GRAD LL NSAF LF --

## (undated) DEVICE — KENDALL SCD EXPRESS SLEEVES, KNEE LENGTH, MEDIUM: Brand: KENDALL SCD

## (undated) DEVICE — SUTURE ETHLN SZ 4-0 L18IN NONABSORBABLE BLK L19MM PS-2 3/8 1667H

## (undated) DEVICE — PACK PROCEDURE SURG EXTREMITY CUST

## (undated) DEVICE — NEEDLE HYPO 25GA L1.5IN BLU POLYPR HUB S STL REG BVL STR

## (undated) DEVICE — KIT PAIN PMP 100ML 1ML/HR CATH L1IN W/ SOAK CATH ON-Q

## (undated) DEVICE — SOLUTION LACTATED RINGERS INJECTION USP

## (undated) DEVICE — KERLIX BANDAGE ROLL: Brand: KERLIX

## (undated) DEVICE — PACK PROCEDURE SURG TOT SHLDR CUST

## (undated) DEVICE — 4.0MM EGG

## (undated) DEVICE — SUT MONOCRYL PLUS UD 3-0 --

## (undated) DEVICE — FEMORAL CANAL TIP